# Patient Record
Sex: MALE | Race: WHITE | NOT HISPANIC OR LATINO | Employment: FULL TIME | URBAN - METROPOLITAN AREA
[De-identification: names, ages, dates, MRNs, and addresses within clinical notes are randomized per-mention and may not be internally consistent; named-entity substitution may affect disease eponyms.]

---

## 2021-04-13 ENCOUNTER — TRANSFERRED RECORDS (OUTPATIENT)
Dept: HEALTH INFORMATION MANAGEMENT | Facility: CLINIC | Age: 46
End: 2021-04-13
Payer: COMMERCIAL

## 2021-11-21 ENCOUNTER — HEALTH MAINTENANCE LETTER (OUTPATIENT)
Age: 46
End: 2021-11-21

## 2021-11-29 NOTE — PROGRESS NOTES
"New Bariatric Nutrition Consultation Note    Reason For Visit: Nutrition Assessment    Bethel Woods is a 46 year old presenting today for new bariatric nutrition consult.  Pt is interested in laparoscopic gastric bypass.  Patient is accompanied by self.    Support System Reviewed With Patient 11/16/2021   Who do you have in your support network that can be available to help you for the first 2 weeks after surgery? Mom and sister   Who can you count on for support throughout your weight loss surgery journey? Best friend       ANTHROPOMETRICS:  Estimated body mass index is 57.2 kg/m  as calculated from the following:    Height as of this encounter: 1.727 m (5' 8\").    Weight as of this encounter: 170.6 kg (376 lb 3.2 oz).    Required weight loss goal pre-op: 10 lbs from initial consult weight (goal weight 366.2 lbs or less before surgery)       11/16/2021   I have tried the following methods to lose weight Watching portions or calories, Exercise, Slimfast, OTC Medications, Prescription Medications       Weight Loss Questions Reviewed With Patient 11/16/2021   How long have you been overweight? Since late 20's to early 40's       NUTRITION HISTORY:  Recall Diet Questions Reviewed With Patient 11/16/2021   Describe what you typically consume for breakfast (typical or most recent): Dont eat breakfast   Describe what you typically consume for lunch (typical or most recent): Pizza, chicken,hamburgers   Describe what you typically consume as snacks (typical or most recent): Ice cream   How many ounces of water, or other low calorie drinks, do you drink daily (8 oz=1 glass)? 32 oz   How many ounces of caffeine (coffee, tea, pop) do you drink daily (8 oz=1 glass)? 8 oz   How many ounces of carbonated (pop, beer, sparkling water) drinks do you drinky daily (8 oz=1 glass)? 8 oz   How many ounces of juice, pop, sweet tea, sports drinks, protein drinks, other sweetened drinks, do you drink daily (8 oz=1 glass)? 8 oz   How many " "ounces of milk do you drink daily (8 oz=1 glass) 8 oz   Please indicate the type of milk: 2%, 1%, skim   How often do you drink alcohol? Monthly or less   If you do drink alcohol, how many drinks might you have in a day? (one drink = 5 oz. wine, 1 can/bottle of beer, 1 shot liquor) 1 or 2       Eating Habits 11/16/2021   Do you have any dietary restrictions? No   Do you currently binge eat (eat a large amount of food in a short time)? Yes   Are you an emotional eater? Yes   Do you get up to eat after falling asleep? Yes   What foods do you crave? Ice cream , fast food       ADDITIONAL INFORMATION:  Pt has been considering surgery for about a year. Note questionnaire answers r/t binge eating. Pt reports a history of emotional eating after \"fat-shaming\" on behalf of coworkers and family members. Endorses significant guilt as well as hiding of behavior. Occurred for several months, almost daily. However, currently behavior is controlled since starting in new work environment as well as establishing boundaries with family members. Binge would typically involve going to get fast food. Do not suspect pt to have true BED however given history of binge behavior will continue to prompt pt.     Education on lifestyle changes and nutrition-related side effects. Deferred discussion on vitamins/minerals and post-op diet advancement d/t time spent discussing binge behavior.     Dining Out History Reviewed With Patient 11/16/2021   How often do you dine out? Around once a week.   Where do you dine out? (select all that apply) sit-down restaurants, fast food chains   What types of food do you order when you dine out? margaret Rosa       Physical Activity Reviewed With Patient 11/16/2021   How often do you exercise? Less than 1 time per week   What is the duration of your exercise (in minutes)? 10 Minutes   What types of exercise do you do? walking, climbing stairs at work   What keeps you from being more active?  Shortness of " breath, Too tired, Worried people will look at me       NUTRITION DIAGNOSIS:  Obesity r/t long history of self-monitoring deficit and excessive energy intake aeb BMI >30 kg/m2.    INTERVENTION:  Intervention Provided/Education Provided on GI anatomy of bariatric surgeries, ways to help prepare for post-op diet guidelines pre-operatively, portion/calorie-control, mindful eating.  Provided pt with list of goals RD contact information.      Questions Reviewed With Patient 11/16/2021   How ready are you to make changes regarding your weight? Number 1 = Not ready at all to make changes up to 10 = very ready. 10   How confident are you that you can change? 1 = Not confident that you will be successful making changes up to 10 = very confident. 10       Patient Understanding: good  Expected Compliance: good    GOALS:  Limit carbonated beverages to 3-4 per day  Choose foods with less than 10g of sugar  Eat meals off a smaller plate and be mindful of starch portions      Follow-Up:   Recommend 2-3 follow up visits to assist with lifestyle changes or per insurance.  **Pt to verify insurance requirements    Time spent with patient: 45 minutes.      Maria Victoria López RD, LD  Clinical Dietitian

## 2021-11-30 ENCOUNTER — OFFICE VISIT (OUTPATIENT)
Dept: SURGERY | Facility: CLINIC | Age: 46
End: 2021-11-30
Payer: COMMERCIAL

## 2021-11-30 VITALS
SYSTOLIC BLOOD PRESSURE: 129 MMHG | HEIGHT: 68 IN | DIASTOLIC BLOOD PRESSURE: 90 MMHG | HEART RATE: 78 BPM | WEIGHT: 315 LBS | BODY MASS INDEX: 47.74 KG/M2 | OXYGEN SATURATION: 96 %

## 2021-11-30 VITALS — BODY MASS INDEX: 47.74 KG/M2 | HEIGHT: 68 IN | WEIGHT: 315 LBS

## 2021-11-30 DIAGNOSIS — F41.1 GENERALIZED ANXIETY DISORDER: ICD-10-CM

## 2021-11-30 DIAGNOSIS — G47.33 MILD OBSTRUCTIVE SLEEP APNEA: ICD-10-CM

## 2021-11-30 DIAGNOSIS — Z13.21 SCREENING FOR ENDOCRINE, NUTRITIONAL, METABOLIC AND IMMUNITY DISORDER: ICD-10-CM

## 2021-11-30 DIAGNOSIS — F32.A DEPRESSION, UNSPECIFIED DEPRESSION TYPE: ICD-10-CM

## 2021-11-30 DIAGNOSIS — Z13.29 SCREENING FOR ENDOCRINE, NUTRITIONAL, METABOLIC AND IMMUNITY DISORDER: ICD-10-CM

## 2021-11-30 DIAGNOSIS — Z13.228 SCREENING FOR ENDOCRINE, NUTRITIONAL, METABOLIC AND IMMUNITY DISORDER: ICD-10-CM

## 2021-11-30 DIAGNOSIS — E66.01 MORBID OBESITY (H): ICD-10-CM

## 2021-11-30 DIAGNOSIS — Z13.0 SCREENING FOR IRON DEFICIENCY ANEMIA: ICD-10-CM

## 2021-11-30 DIAGNOSIS — I10 ESSENTIAL HYPERTENSION: ICD-10-CM

## 2021-11-30 DIAGNOSIS — E66.01 MORBID OBESITY (H): Primary | ICD-10-CM

## 2021-11-30 DIAGNOSIS — Z13.0 SCREENING FOR ENDOCRINE, NUTRITIONAL, METABOLIC AND IMMUNITY DISORDER: ICD-10-CM

## 2021-11-30 PROBLEM — J45.20 MILD INTERMITTENT ASTHMA: Status: ACTIVE | Noted: 2021-11-30

## 2021-11-30 PROBLEM — I48.0 PAROXYSMAL ATRIAL FIBRILLATION (H): Status: ACTIVE | Noted: 2021-02-05
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
## 2021-11-30 PROCEDURE — 97802 MEDICAL NUTRITION INDIV IN: CPT | Performed by: DIETITIAN, REGISTERED

## 2021-11-30 PROCEDURE — 99205 OFFICE O/P NEW HI 60 MIN: CPT | Performed by: PHYSICIAN ASSISTANT

## 2021-11-30 RX ORDER — ALBUTEROL SULFATE 90 UG/1
2 AEROSOL, METERED RESPIRATORY (INHALATION) EVERY 4 HOURS PRN
COMMUNITY
Start: 2020-02-24

## 2021-11-30 RX ORDER — ASCORBIC ACID 500 MG
1000 TABLET ORAL DAILY
Status: ON HOLD | COMMUNITY
End: 2024-07-12

## 2021-11-30 RX ORDER — ALPRAZOLAM 0.5 MG
0.5 TABLET ORAL DAILY PRN
COMMUNITY
Start: 2020-12-29 | End: 2024-07-12

## 2021-11-30 RX ORDER — ACETAMINOPHEN 500 MG
1000 TABLET ORAL
COMMUNITY
Start: 2021-08-02 | End: 2022-05-25

## 2021-11-30 RX ORDER — LISINOPRIL AND HYDROCHLOROTHIAZIDE 20; 25 MG/1; MG/1
1 TABLET ORAL DAILY
COMMUNITY
Start: 2020-11-24 | End: 2022-05-27

## 2021-11-30 RX ORDER — PAROXETINE 40 MG/1
1 TABLET, FILM COATED ORAL DAILY
Status: ON HOLD | COMMUNITY
Start: 2020-05-04 | End: 2024-07-12

## 2021-11-30 RX ORDER — MULTIPLE VITAMINS W/ MINERALS TAB 9MG-400MCG
1 TAB ORAL DAILY
COMMUNITY
End: 2022-05-25

## 2021-11-30 RX ORDER — FLECAINIDE ACETATE 50 MG/1
100 TABLET ORAL 2 TIMES DAILY
COMMUNITY
Start: 2021-05-07 | End: 2022-11-28

## 2021-11-30 RX ORDER — METOPROLOL SUCCINATE 100 MG/1
50 TABLET, EXTENDED RELEASE ORAL 2 TIMES DAILY
Status: ON HOLD | COMMUNITY
Start: 2020-02-24 | End: 2022-05-27

## 2021-11-30 ASSESSMENT — MIFFLIN-ST. JEOR
SCORE: 2550.93
SCORE: 2550.93

## 2021-11-30 NOTE — PATIENT INSTRUCTIONS
Plan:   Please refer to your bariatric task list created today at your appointment.  Labs have been ordered in the system for you. You can have these drawn at any [facility] lab.  Please call [phone] set up an appointment.  Your results will be posted on [facility] as soon as they're complete.  After all are resulted, I will review and comment to you.  Psychological evaluation was ordered.  Call [phone] to be scheduled with Joshua Taylor, PhD,    FOLLOW-UP:  Call [phone] to make appointment to return for a pre-surgery in clinic visit in 4-8 weeks to see Elaina Wilkins PA-C. Make 1 month follow up nutrition visit virtually or in clinic.  _____________________________________________________________________  In general before being submitted for insurance approval, you will need the following:  -Clearance by the Psychologist  -Clearance by the dietitian  -Structured weight loss visits if mandated by your insurance carrier  -Surgeon consult  -Use CPAP for at least one month before surgery if you have sleep apnea. Make sure to bring your CPAP or BiPAP to the hospital at the time of surgery.  -You will need to be tobacco free for 3 months before surgery and remain a non-smoker thereafter. If you are currently smoking or have recently quit, your urine will be evaluated for tobacco metabolites pre-operatively.  -If you have diabetes, you will need to have an A1C less than or equal to 8.0 within 3 months of surgery.  -You will need an exercise plan which includes MOVE, ie., walking and MUSCLE, ie.,calisthenics, bands, weight, machines, etc...  _____________________________________________________________________  Remember that after your bariatric surgery, vitamin supplementation is a lifelong need.  You will take:    B-12 1000mcg or higher sublingual (under the tongue) daily or by injection 1-2X /month  Vitamin D3 5000U daily   Multivitamin containing 18mg of iron twice a day  Calcium citrate 1 or 2  daily  Thiamine 100 mg weekly   Vitron C once daily if you are a menstruating female  To keep your weight off and your vitamin levels up, follow-up is important.  Your labs will be monitored every 3 months for the first year and yearly thereafter.  To avoid ulcers in your stomach avoid tobacco forever, alcohol in excess, caffeine in excess and anti-inflammatories (NSAIDS)  (Aspirin, Ibuprofen, Naproxen and similar medications). Tylenol is fine.  If you are told by your physician take Aspirin to protect your heart or for another reason, make sure to take omeprazole or similar medication (protonix, nexium, prevacid) to protect your stomach.  Remember that alcohol affects you differently after bariatric surgery. If you have even ONE drink DO NOT DRIVE.

## 2021-11-30 NOTE — PATIENT INSTRUCTIONS
1. Reduce carbonated beverages to 3-4 per day    2. Be mindful of sugar intake  - choose products with less than 10g of sugar    3. Practice portion control  - Eat meals off a smaller plate  - Be mindful of portions of starch

## 2021-11-30 NOTE — PROGRESS NOTES
"New Bariatric Surgery Consultation Note    2021    RE: Bethel Woods  MR#: [id number]  : 1975    Referring provider: No flowsheet data found.    Chief Complaint/Reason for visit: evaluation for possible weight loss surgery    Dear Rae Rios MD (General),    I had the pleasure of seeing your patient, Bethel Woods, to evaluate his obesity and consider him for possible weight loss surgery. As you know, Bethel Woods is 46 year old.  He has a height of 5' 8\", a weight of 376 lbs 3.2 oz, and calculated Body mass index is 57.2 kg/m .     Assessment & Plan   Problem List Items Addressed This Visit     Essential hypertension    Relevant Medications    lisinopril-hydrochlorothiazide (ZESTORETIC) 20-25 MG tablet    Other Relevant Orders    Comprehensive metabolic panel    Depression    Relevant Medications    PARoxetine (PAXIL) 40 MG tablet    ALPRAZolam (XANAX) 0.5 MG tablet    Other Relevant Orders    MENTAL HEALTH REFERRAL  - Adult; Assessments and Testing; [phone]; Patient call to schedule    Generalized anxiety disorder    Relevant Medications    PARoxetine (PAXIL) 40 MG tablet    ALPRAZolam (XANAX) 0.5 MG tablet    Other Relevant Orders    MENTAL HEALTH REFERRAL  - Adult; Assessments and Testing; Bariatric Eval; [facility] [phone]; Patient call to schedule    Morbid obesity (H) - Primary    Relevant Orders    NUTRITION REFERRAL    CBC with platelets    Comprehensive metabolic panel    Hemoglobin A1c    Vitamin D Deficiency    MENTAL HEALTH REFERRAL  - Adult; Assessments and Testing; Bariatric Eval; [facility] [phone]; Patient call to schedule    Mild obstructive sleep apnea    Relevant Orders    Comprehensive metabolic panel      Other Visit Diagnoses     Screening for iron deficiency anemia        Relevant Orders    CBC with platelets    Screening " for endocrine, nutritional, metabolic and immunity disorder        Relevant Orders    Hemoglobin A1c    Vitamin D Deficiency           65 minutes spent on the date of the encounter doing chart review, history and exam, review test results, counseling, developing plan of care, documentation, and further activities as noted above.       HISTORY OF PRESENT ILLNESS:  Weight Loss History Reviewed with Patient 11/16/2021   How long have you been overweight? Since late 20's to early 40's   What is the most that you have ever weighed? 395   What is the most weight you have lost? 110- not eating   I have tried the following methods to lose weight Watching portions or calories, Exercise, Slimfast, OTC Medications, Prescription Medications   I have tried the following weight loss medications? (Check all that apply) Phentermine/Adipex-p/Suprenza, Fen-Phen   Have you ever had weight loss surgery? No       CO-MORBIDITIES OF OBESITY INCLUDE:     11/16/2021   I have the following health issues associated with obesity: High Blood Pressure, Asthma       PAST MEDICAL HISTORY:  Past Medical History:   Diagnosis Date     Hypertension        PAST SURGICAL HISTORY:  Past Surgical History:   Procedure Laterality Date     HC OR CATH ABLATION NON-CARDIAC ENDOVASCULAR       VASECTOMY         FAMILY HISTORY:   Family History   Problem Relation Age of Onset     Obesity Mother      Coronary Artery Disease Father 62        MI- Fatal     Cerebrovascular Disease Father      Obesity Maternal Grandmother      Coronary Artery Disease Maternal Grandfather      Hypertension Maternal Grandfather      Hyperlipidemia Maternal Grandfather      Obesity Brother        SOCIAL HISTORY:   Social History Questions Reviewed With Patient 11/16/2021   Which best describes your employment status (select all that apply) I work full-time, I work nights  10-6:30am    If you work, what is your occupation?    Which best describes your marital status:  Single, but mom lives with him.     Do you have children? Yes, 1 son age 24.     Who do you have in your support network that can be available to help you for the first 2 weeks after surgery? Mom and sister   Who can you count on for support throughout your weight loss surgery journey? Best friend   Can you afford 3 meals a day?  Yes   Can you afford 50-60 dollars a month for vitamins? Yes       HABITS:     11/16/2021   How often do you drink alcohol? Monthly or less   If you do drink alcohol, how many drinks might you have in a day? (one drink = 5 oz. wine, 1 can/bottle of beer, 1 shot liquor) 1 or 2   Have you ever used any of the following nicotine products? No   Have you or are you currently using street drugs or prescription strength medication for which you do not have a prescription for? No   Do you have a history of chemical dependency (alcohol or drug abuse)? No       PSYCHOLOGICAL HISTORY:   Psychological History Reviewed With Patient 11/16/2021   Have you ever attempted suicide? Never.   Have you had thoughts of suicide in the past year? No   Have you ever been hospitalized for mental illness or a suicide attempt? Never.   Do you have a history of chronic pain? No   Have you ever been diagnosed with fibromyalgia? No   Are you currently being treated for any of the following? (select all that apply) Depression, Anxiety   Are you currently seeing a therapist or counselor?  No   Are you currently seeing a psychiatrist? No       ROS:     11/16/2021   Skin:  None of the above   HEENT: Headaches- minimal since stopping caffeine 2 years ago,   Musculoskeletal: Joint Pain- knees   Cardiovascular: Shortness of breath with activity   Pulmonary: Shortness of breath with activity, Snoring   Gastrointestinal: Heartburn- minimal since stopping caffeine   Genitourinary: None of the above   Hematological: None of the above   Neurological: None of the above       EATING BEHAVIORS:     11/16/2021   Have you or anyone else  thought that you had an eating disorder? Yes   If you answered yes to the previous eating disorder question, select the types that apply from this list: Binge Eating- some days pt is out and about and he didn't eat all day so then he eats, eats, eats.  Or he stress eats.    Do you currently binge eat (eat a large amount of food in a short time)? Yes   Are you an emotional eater? Yes   Do you get up to eat after falling asleep? Yes     Does your binge eating cause you stress? yes  Does binge occur at least 1 time per week for the past 3 months?  Occurs once weekly for the past 15-16 years.  Has a lot to do because he was teased about his weight.  Occurs due to how he is feeling.  Was happening more frequently than once a week with old job.  (New job since August).  He got another job because he was fat shamed in the past.  The only time he really does this now is when his mom makes those comments.  Less of a binge and more just emotional eating during these times.   EXERCISE:     11/16/2021   How often do you exercise? Less than 1 time per week   What is the duration of your exercise (in minutes)? 10 Minutes   What types of exercise do you do? walking, climbing stairs at work   What keeps you from being more active?  Shortness of breath, Too tired, Worried people will look at me     Using Albuterol daily when using stairs. Not on another medication for asthma      MEDICATIONS:  Current Outpatient Medications   Medication     acetaminophen (TYLENOL) 500 MG tablet     albuterol (VENTOLIN HFA) 108 (90 Base) MCG/ACT inhaler     ALPRAZolam (XANAX) 0.5 MG tablet     apixaban ANTICOAGULANT (ELIQUIS ANTICOAGULANT) 5 MG tablet     Aspirin-Acetaminophen-Caffeine (EXCEDRIN PO)     flecainide (TAMBOCOR) 50 MG tablet     lisinopril-hydrochlorothiazide (ZESTORETIC) 20-25 MG tablet     metoprolol succinate ER (TOPROL-XL) 50 MG 24 hr tablet     Multiple Vitamin (MULTI VITAMIN MENS PO)     multivitamin w/minerals (MULTIVITAMIN,  "THERAPEUTIC WITH MINERALS) tablet     PARoxetine (PAXIL) 40 MG tablet     vitamin C (ASCORBIC ACID) 500 MG tablet     No current facility-administered medications for this visit.        ALLERGIES:  No Known Allergies    LABS AND RECORDS REVIEWED:  TSH   Date Value Ref Range Status   06/03/2009 1.89 0.4 - 5.0 mU/L Final         PHYSICAL EXAM:  BP (!) 129/90   Pulse 78   Ht 5' 8\" (1.727 m)   Wt (!) 376 lb 3.2 oz (170.6 kg)   SpO2 96%   BMI 57.20 kg/m    GENERAL:  Good development and normal affect in no acute distress. Patient is alert and orientated x 3 and answers all questions appropriately.  HEENT: Head is normocephalic, nontraumatic. Pupils equal and round without conjunctival injection. Neck is supple without lymphadenopathy, thyroidmegaly, or mass. Trachea midline. Dentition shows missing molar on upper left and lower left not on top of each other. Dentition appropriate for chewing following surgery.   CARDIOVASCULAR:  Regular rate and rhythm without murmurs, rubs, or gallops.  RESPIRATORY: Lungs are clear to auscultation bilaterally with good breath sounds.  GASTROINTESTINAL: Abdomen is obese, nondistended, soft, nontender, without organomegaly or masses. No bruits.  LOWER EXTREMITIES: No LE edema bilaterally, no cyanosis, ulceration, or chronic venous stasis noted.  MUSCULOSKELETAL:  Moves all 4 extremities equal and strong. Has a normal gait.   NEUROLOGIC: Cranial nerves II-XII grossly intact.  SKIN: No intertriginous irritation or rash.     In summary, Bethel Woods has Class III obesity with a body mass index of Body mass index is 57.2 kg/m . kg/m2 and the comorbidities stated above. He completed an informational seminar and is a possible candidate for the laparoscopic gastric bypass.  He will have to complete the following pre-requisites:    Lose 10 lbs prior to surgery.  Goal Weight: 366 lbs    Have preoperative laboratory tests drawn.     Psychological Evaluation with MMPI and clearance for weight " loss surgery.    Achieve clearance from dietitian to see surgeon.    Check if structured dietitian weight loss visits are required by your insurance.    Get records from [facility] ([facility] signed and sent to [facility] today)    See primary care provider about better asthma control     Once Bethel has completed the requirements in the above tasklist and there are no further recommendations, he will see the surgeon for consultation for bariatric surgery. Pt to follow up in 1-2 months for a face to face visit with me. We will discuss the gastric bypass surgery including risks and benefits and review his tasklist.  Bethel verbalizes understanding of the process to surgery and the post operative schedule.  All questions were answered.      Sincerely,     Elaina Wilkins PA-C

## 2021-11-30 NOTE — LETTER
Bethel Woods  November 30, 2021        Bariatric Task List      Required Weight loss:    Lose 10 lbs prior to surgery.  Goal Weight: 366 lbs  Tasks:  Have preoperative laboratory tests drawn.     Psychological Evaluation with MMPI and clearance for weight loss surgery.    Achieve clearance from dietitian to see surgeon.    Check if structured dietitian weight loss visits are required by your insurance.    Get records from [facility]    See primary care provider about better asthma control

## 2021-12-27 ENCOUNTER — LAB (OUTPATIENT)
Dept: LAB | Facility: CLINIC | Age: 46
End: 2021-12-27
Payer: COMMERCIAL

## 2021-12-27 DIAGNOSIS — Z13.21 SCREENING FOR ENDOCRINE, NUTRITIONAL, METABOLIC AND IMMUNITY DISORDER: ICD-10-CM

## 2021-12-27 DIAGNOSIS — I10 ESSENTIAL HYPERTENSION: ICD-10-CM

## 2021-12-27 DIAGNOSIS — Z13.0 SCREENING FOR IRON DEFICIENCY ANEMIA: ICD-10-CM

## 2021-12-27 DIAGNOSIS — Z13.0 SCREENING FOR ENDOCRINE, NUTRITIONAL, METABOLIC AND IMMUNITY DISORDER: ICD-10-CM

## 2021-12-27 DIAGNOSIS — Z13.228 SCREENING FOR ENDOCRINE, NUTRITIONAL, METABOLIC AND IMMUNITY DISORDER: ICD-10-CM

## 2021-12-27 DIAGNOSIS — G47.33 MILD OBSTRUCTIVE SLEEP APNEA: ICD-10-CM

## 2021-12-27 DIAGNOSIS — E66.01 MORBID OBESITY (H): ICD-10-CM

## 2021-12-27 DIAGNOSIS — Z13.29 SCREENING FOR ENDOCRINE, NUTRITIONAL, METABOLIC AND IMMUNITY DISORDER: ICD-10-CM

## 2021-12-27 LAB
ALBUMIN SERPL-MCNC: 3.6 G/DL (ref 3.4–5)
ALP SERPL-CCNC: 99 U/L (ref 40–150)
ALT SERPL W P-5'-P-CCNC: 31 U/L (ref 0–70)
ANION GAP SERPL CALCULATED.3IONS-SCNC: 5 MMOL/L (ref 3–14)
AST SERPL W P-5'-P-CCNC: 19 U/L (ref 0–45)
BILIRUB SERPL-MCNC: 0.3 MG/DL (ref 0.2–1.3)
BUN SERPL-MCNC: 14 MG/DL (ref 7–30)
CALCIUM SERPL-MCNC: 9.5 MG/DL (ref 8.5–10.1)
CHLORIDE BLD-SCNC: 102 MMOL/L (ref 94–109)
CO2 SERPL-SCNC: 29 MMOL/L (ref 20–32)
CREAT SERPL-MCNC: 0.77 MG/DL (ref 0.66–1.25)
DEPRECATED CALCIDIOL+CALCIFEROL SERPL-MC: 28 UG/L (ref 20–75)
ERYTHROCYTE [DISTWIDTH] IN BLOOD BY AUTOMATED COUNT: 14.8 % (ref 10–15)
GFR SERPL CREATININE-BSD FRML MDRD: >90 ML/MIN/1.73M2
GLUCOSE BLD-MCNC: 91 MG/DL (ref 70–99)
HBA1C MFR BLD: 5.5 % (ref 0–5.6)
HCT VFR BLD AUTO: 44.1 % (ref 40–53)
HGB BLD-MCNC: 14.6 G/DL (ref 13.3–17.7)
MCH RBC QN AUTO: 27 PG (ref 26.5–33)
MCHC RBC AUTO-ENTMCNC: 33.1 G/DL (ref 31.5–36.5)
MCV RBC AUTO: 82 FL (ref 78–100)
PLATELET # BLD AUTO: 415 10E3/UL (ref 150–450)
POTASSIUM BLD-SCNC: 4.4 MMOL/L (ref 3.4–5.3)
PROT SERPL-MCNC: 8.5 G/DL (ref 6.8–8.8)
RBC # BLD AUTO: 5.4 10E6/UL (ref 4.4–5.9)
SODIUM SERPL-SCNC: 136 MMOL/L (ref 133–144)
WBC # BLD AUTO: 13.1 10E3/UL (ref 4–11)

## 2021-12-27 PROCEDURE — 85027 COMPLETE CBC AUTOMATED: CPT

## 2021-12-27 PROCEDURE — 80053 COMPREHEN METABOLIC PANEL: CPT

## 2021-12-27 PROCEDURE — 82306 VITAMIN D 25 HYDROXY: CPT

## 2021-12-27 PROCEDURE — 36415 COLL VENOUS BLD VENIPUNCTURE: CPT

## 2021-12-27 PROCEDURE — 83036 HEMOGLOBIN GLYCOSYLATED A1C: CPT

## 2021-12-27 NOTE — PROGRESS NOTES
"PRE SURGICAL WEIGHT LOSS NUTRITION APPOINTMENT    Bethel Woods  1975  male  [id number]  46 year old    ASSESSMENT    Desired Surgical Procedure: gastric bypass    REASON FOR VISIT:  Bethel Woods is a 46 year old year old male presents today for a pre-surgical weight loss follow-up appointment. Patient accompanied by self.    DIAGNOSIS:  Weight Status Obesity Grade III BMI >40    ANTHROPOMETRICS:  Height: 68\"     Initial Weight: 366.2 lbs     Current weight: 379 lbs 0 oz    BMI: Body mass index is 57.63 kg/m .    VITAMINS AND MINERALS:  1 Multivitamin w/Minerals (One-A-Day Men)      NUTRITION HISTORY:  Breakfast: [wakes at 5:30pm, eats at 6pm] steak or chicken + baked potato + corn   pizza (3 slices)  Lunch: skips  Supper: [7am] bowl of cereal  Snacks: none   Fluids Consumed: water (24-36oz), carbonated water (40oz), milk (2 glasses/week - skim, 1% or 2%)  Eating slower: No  Chewing foods thoroughly: No  Take 20-30 minutes to consume each meal: No  Fluids and meals separate by at least 30 minutes: No  Carbonation: yes  Caffeine: none  Additional Information: Completed surgical evaluation education today on post-op diet advancement vitamin/mineral needs. Reviewed expectations for behavior change in anticipation of clearance. Pt states weight gain d/t overeating throughout holidays.     PHYSICAL ACTIVITY:  none    DIAGNOSIS:  Previous Nutrition Diagnosis: Obesity related to long history of self- monitoring deficit and excessive energy intake evidenced by BMI of 57.2 kg/m2  No change, modified below    Previous goals:   Limit carbonated beverages to 3-4 per day - met  Choose foods with less than 10g of sugar - not met  Eat meals off a smaller plate and be mindful of starch portions - improving    Current Nutrition Diagnosis: Obesity related to long history of self-monitoring deficit and excessive energy intake as evidenced by BMI of 57.63 kg/m2.    INTERVENTION:  Nutrition Prescription: Recommended " energy/nutrient modification.    GOALS:  Begin taking multivitamin, calcium and vitamin D per guidelines  Avoid skipping meals  Choose foods with less than 10g of sugar      Intervention:  - Discussed progress towards previous goals.  - Reinforced importance of making behavior changes in preparation for bariatric surgery.   - Assessed learning needs and learning preferences       NUTRITION MONITORING AND EVALUATION:  Anticipated compliance: fair-good  Patient demonstrated fair-good understanding.     Follow up: Continue to monitor patient closely regarding weight loss and diet.  # of visits needed: 1-2+  Cleared by RD: No     TIME SPENT WITH PATIENT: 25 minutes      Maria Victoria López RD, LD  Clinical Dietitian

## 2021-12-28 ENCOUNTER — OFFICE VISIT (OUTPATIENT)
Dept: SURGERY | Facility: CLINIC | Age: 46
End: 2021-12-28
Payer: COMMERCIAL

## 2021-12-28 VITALS — WEIGHT: 315 LBS | BODY MASS INDEX: 47.74 KG/M2 | HEIGHT: 68 IN

## 2021-12-28 DIAGNOSIS — E66.01 MORBID OBESITY (H): ICD-10-CM

## 2021-12-28 PROCEDURE — 97803 MED NUTRITION INDIV SUBSEQ: CPT | Performed by: DIETITIAN, REGISTERED

## 2021-12-28 ASSESSMENT — MIFFLIN-ST. JEOR: SCORE: 2563.63

## 2021-12-28 NOTE — PATIENT INSTRUCTIONS
1. Begin taking:  Multivitamin  Calcium  Vitamin D      2. Avoid skipping meals  - Eat your first meal within 1 hour of waking up  - Eat meals every 4-6 hours      3. Choose foods with less than 10g of sugar per serving

## 2022-01-16 NOTE — PROGRESS NOTES
Bariatric Pre-Surgery Visit    CC: Obesity    HPI: I had the pleasure of seeing Bethel in the office today to go over bariatric education.  I saw the patient last month to evaluate obesity and consider him for possible weight loss surgery. Today,  we will discuss the gastric bypass surgery including risks and benefits and review his tasklist.     Wt Readings from Last 4 Encounters:   01/18/22 (!) 378 lb (171.5 kg)   12/28/21 (!) 379 lb (171.9 kg)   11/30/21 (!) 376 lb 3.2 oz (170.6 kg)   11/30/21 (!) 376 lb 3.2 oz (170.6 kg)        Labs Reviewed:  Hemoglobin A1C   Date Value Ref Range Status   12/27/2021 5.5 0.0 - 5.6 % Final     Comment:     Normal <5.7%   Prediabetes 5.7-6.4%    Diabetes 6.5% or higher     Note: Adopted from ADA consensus guidelines.     Vitamin D, Total (25-Hydroxy)   Date Value Ref Range Status   12/27/2021 28 20 - 75 ug/L Final     TSH   Date Value Ref Range Status   06/03/2009 1.89 0.4 - 5.0 mU/L Final     Sodium   Date Value Ref Range Status   12/27/2021 136 133 - 144 mmol/L Final     Potassium   Date Value Ref Range Status   12/27/2021 4.4 3.4 - 5.3 mmol/L Final     Chloride   Date Value Ref Range Status   12/27/2021 102 94 - 109 mmol/L Final     Carbon Dioxide (CO2)   Date Value Ref Range Status   12/27/2021 29 20 - 32 mmol/L Final     Anion Gap   Date Value Ref Range Status   12/27/2021 5 3 - 14 mmol/L Final     Glucose   Date Value Ref Range Status   12/27/2021 91 70 - 99 mg/dL Final     Urea Nitrogen   Date Value Ref Range Status   12/27/2021 14 7 - 30 mg/dL Final     Creatinine   Date Value Ref Range Status   12/27/2021 0.77 0.66 - 1.25 mg/dL Final     GFR Estimate   Date Value Ref Range Status   12/27/2021 >90 >60 mL/min/1.73m2 Final     Comment:     Effective December 21, 2021 eGFRcr in adults is calculated using the 2021 CKD-EPI creatinine equation which includes age and gender (Juanjose et al., NEJM, DOI: 10.1056/URPFab7020485)     Calcium   Date Value Ref Range Status   12/27/2021 9.5  "8.5 - 10.1 mg/dL Final     Bilirubin Total   Date Value Ref Range Status   12/27/2021 0.3 0.2 - 1.3 mg/dL Final     Alkaline Phosphatase   Date Value Ref Range Status   12/27/2021 99 40 - 150 U/L Final     ALT   Date Value Ref Range Status   12/27/2021 31 0 - 70 U/L Final     AST   Date Value Ref Range Status   12/27/2021 19 0 - 45 U/L Final     WBC Count   Date Value Ref Range Status   12/27/2021 13.1 (H) 4.0 - 11.0 10e3/uL Final     Hemoglobin   Date Value Ref Range Status   12/27/2021 14.6 13.3 - 17.7 g/dL Final     Hematocrit   Date Value Ref Range Status   12/27/2021 44.1 40.0 - 53.0 % Final     MCV   Date Value Ref Range Status   12/27/2021 82 78 - 100 fL Final     Platelet Count   Date Value Ref Range Status   12/27/2021 415 150 - 450 10e3/uL Final       PE:  Ht 5' 8\" (1.727 m)   Wt (!) 378 lb (171.5 kg)   BMI 57.47 kg/m    GENERAL: Healthy, alert and no distress  EYES: Eyes grossly normal to inspection.  No discharge or erythema, or obvious scleral/conjunctival abnormalities.  RESP: No audible wheeze, cough, or visible cyanosis.  No visible retractions or increased work of breathing.    SKIN: Visible skin clear. No significant rash, abnormal pigmentation or lesions.  NEURO: Cranial nerves grossly intact.  Mentation and speech appropriate for age.  PSYCH: Mentation appears normal, affect normal/bright, judgement and insight intact, normal speech and appearance well-groomed.    Assessment:  Morbid Obesity  Bariatric Education    Plan:  Reviewed tasklist    Lose 10 lbs prior to surgery.  Goal Weight: 366 lbs -pending    Have preoperative laboratory tests drawn.- completed     Psychological Evaluation with MMPI and clearance for weight loss surgery.- pending    Achieve clearance from dietitian to see surgeon.-pending    Check if structured dietitian weight loss visits are required by your insurance..- completed none needed    Get records from [facility] (BRYAN signed and sent to [facility] today)- completed, mild DEEPALI, " no CPAP optional     See primary care provider about better asthma control- on advair , completed    Today in the office we discussed gastric bypass surgery.  Preoperative, perioperative, and postoperative processes, management, and follow up were addressed.  Risks and benefits were outlined including the risk of death, PE, DVT, ulcer, N/V, stricture, hernia, wound infection, weight regain, and vitamin deficiencies. I emphasized exercise and activity along with appropriate food choice as the main foundation for weight loss with surgery providing surgical reinforcement of this.  A goal sheet and support group handout were given to the patient. Patient contract was signed.     Once the patient has completed their task list and there are no further recommendations, they will see the surgeon for consultation for bariatric surgery.  All questions were answered. Patient verbalizes understanding of the process to surgery and expectations for the postoperative period including the need for lifelong lifestyle changes, vitamin supplementation, and laboratory monitoring.       Sincerely,     Elaina Wilkins PA-C    FOLLOW-UP:  Return to clinic in monthly until cleared by dietitian.      30 minutes spent on the date of the encounter reviewing chart, labs, patient education, charting, and plan of care.

## 2022-01-17 NOTE — PROGRESS NOTES
"Video-Visit Details    Type of service:  Video Visit    Video Start Time (time video started): 10:38    Video End Time (time video stopped): 11:06    Originating Location (pt. Location): Home    Distant Location (provider location):  [facility] [place]     Mode of Communication:  Video Conference via [facility]        PRE SURGICAL WEIGHT LOSS NUTRITION APPOINTMENT    Bethel Woods  1975  male  [id number]  46 year old    ASSESSMENT    Desired Surgical Procedure: gastric bypass    REASON FOR VISIT:  Bethel Woods is a 46 year old year old male presents today for a pre-surgical weight loss follow-up appointment. Patient accompanied by self.    DIAGNOSIS:  Weight Status Obesity Grade III BMI >40    ANTHROPOMETRICS:  Height: 68\"   Initial Weight: 376.2 lb     Current weight:378 lb per pt    BMI: 57.47  kg/(m^2).    VITAMINS AND MINERALS:  1 Multivitamin with Minerals ([employer] multivitamin/ can't read the bottle) -7 am after work  18 mg iron-noon  600 mg Calcium Citrate with Vitamin D BID at 4 pm and 9:30 pm  5000 International units Vitamin D-noon      NUTRITION HISTORY:  Breakfast: 1 cup cold cereal with milk or 2 eggs  Lunch: meat sandwich or soup (variety)  Supper: steak or chicken, corn or green beans, sometimes 1/2 potato  Snacks: sugar free gelatin or 3 protein drink with 20 grams protein/day  Fluids Consumed: oz  Water or enhanced water, 3 protein drinks, very rare ETOH (6 pack of beer per year)  Eating slower: Yes  Chewing foods thoroughly: Yes  Take 20-30 minutes to consume each meal: Yes  Fluids and meals separate by at least 30 minutes: No  Carbonation: no  Caffeine: no  Additional Information: He feel like eating smaller portions has helped the  most with weight loss. Patient is \"excited\" to be moving forward with weight loss surgery. Walks 8 hour/day at work, but plans to look into buying a home exercise machine. After talking about intentional exercise he is willing " to try mall walking.    PHYSICAL ACTIVITY:  Type: no intentional exercise      DIAGNOSIS:  Previous Nutrition Diagnosis: Obesity related to long history of self- monitoring deficit and excessive energy intake evidenced by BMI of 57.63 kg/m2  No change, modified below    Previous goals:   Begin taking multivitamin, calcium and vitamin D per guidelines-met  Avoid skipping meals-met  Choose foods with less than 10g of sugar-met    Current Nutrition Diagnosis: Obesity related to long history of self-monitoring deficit and excessive energy intake as evidenced by BMI of 57.47 kg/m2.    INTERVENTION:  Nutrition Prescription: Recommended energy/nutrient modification.    GOALS:  Walk (at mall or outside) 2-3 times per week for 30 minutes  Set alerts to remember to stop drinking 30 minutes before and after meals  Focus on eating protein at each meal/ reduce protein drinks to 1-2 per day      Intervention:  -Reviewed foods rich in protein and provided source of protein food list  - Discussed progress towards previous goals.  - Reinforced importance of making behavior changes in preparation for bariatric surgery.   - Assessed learning needs and learning preferences       NUTRITION MONITORING AND EVALUATION:  Anticipated compliance: good  Patient demonstrated good understanding.       Follow up: Continue to monitor patient closely regarding weight loss and diet.  # of visits needed: 1-2  Cleared by RD: No     TIME SPENT WITH PATIENT: 28 minutes  Luisito Hair RD, LD  [facility]

## 2022-01-18 ENCOUNTER — VIRTUAL VISIT (OUTPATIENT)
Dept: SURGERY | Facility: CLINIC | Age: 47
End: 2022-01-18
Payer: COMMERCIAL

## 2022-01-18 VITALS — WEIGHT: 315 LBS | HEIGHT: 68 IN | BODY MASS INDEX: 47.74 KG/M2

## 2022-01-18 DIAGNOSIS — E66.01 MORBID OBESITY (H): Primary | ICD-10-CM

## 2022-01-18 DIAGNOSIS — E66.01 MORBID OBESITY (H): ICD-10-CM

## 2022-01-18 DIAGNOSIS — Z71.89 ENCOUNTER FOR PRE-BARIATRIC SURGERY COUNSELING AND EDUCATION: ICD-10-CM

## 2022-01-18 PROCEDURE — 97803 MED NUTRITION INDIV SUBSEQ: CPT | Mod: GT | Performed by: DIETITIAN, REGISTERED

## 2022-01-18 PROCEDURE — 99214 OFFICE O/P EST MOD 30 MIN: CPT | Mod: GT | Performed by: PHYSICIAN ASSISTANT

## 2022-01-18 RX ORDER — FERROUS SULFATE 325(65) MG
325 TABLET ORAL
COMMUNITY
End: 2022-08-29

## 2022-01-18 ASSESSMENT — MIFFLIN-ST. JEOR: SCORE: 2559.1

## 2022-01-18 NOTE — PROGRESS NOTES
Bethel is a 46 year old who is being evaluated via a billable video visit.      If the video visit is dropped, the invitation should be resent by: Text to cell phone: 1  Will anyone else be joining your video visit? No      Video-Visit Details    Type of service:  Video Visit    Video Start Time: 10:05 AM    Video End Time:10:28 AM     Originating Location (pt. Location): Home    Distant Location (provider location):  [facility] CL[facility] [place]     Platform used for Video Visit: [employer]

## 2022-01-18 NOTE — PATIENT INSTRUCTIONS
Gopi Hugo-  It was great to visit with you and learn about your progress. Below are the goals we discussed.    GOALS:  Walk (at mall or outside) 2-3 times per week for 30 minutes  Set alerts to remember to stop drinking 30 minutes before and after meals  Focus on eating protein at each meal/ reduce protein drinks to 1-2 per day    Nutrition Educational Materials:  Protein Sources for Weight Loss  [email or web address]       Please call [phone] to schedule your next  visit with a Dietitian in 1 month.  Thanks!  Luisito Hair RD, LD  [facility]

## 2022-02-21 NOTE — PROGRESS NOTES
"Video-Visit Details    Type of service:  Video Visit    Video Start Time (time video started): 9:59    Video End Time (time video stopped): 10:16    Originating Location (pt. Location): Home    Distant Location (provider location):  [facility] [place] /[facility] location    Mode of Communication:  Video Conference via [employer]      PRE SURGICAL WEIGHT LOSS NUTRITION APPOINTMENT    Bethel Woods  1975  male  [id number]  46 year old    ASSESSMENT    Desired Surgical Procedure: gastric bypass    REASON FOR VISIT:  Bethel Woods is a 46 year old year old male presents today for a pre-surgical weight loss follow-up appointment. Patient accompanied by self.    DIAGNOSIS:  Weight Status Obesity Grade III BMI >40    ANTHROPOMETRICS:  Height:  68\"  Initial Weight: 376.2 lb   Weight last visit: 378 lb    Current weight: 363 lb per pt   BMI:  55.19 Kg/(m^2).    Wt Readings from Last 5 Encounters:   01/18/22 (!) 171.5 kg (378 lb)   12/28/21 (!) 171.9 kg (379 lb)   11/30/21 (!) 170.6 kg (376 lb 3.2 oz)   11/30/21 (!) 170.6 kg (376 lb 3.2 oz)       VITAMINS AND MINERALS:  1 Multivitamin with Minerals ([employer] multivitamin/ can't read the bottle) -7 am after work  18 mg iron-noon  600 mg Calcium Citrate with Vitamin D BID at 4 pm and 9:30 pm  5000 International units Vitamin D-noon  1000 mg vitamin C      NUTRITION HISTORY:  Breakfast: 2 eggs  Lunch: 1/2 meat sandwich  Supper: chicken thigh or breast or cod, corn, green beans  Snacks: 2 protein drinks  Fluids Consumed: Water, Crystal Light and Protein Drink,  Eating slower: Yes  Chewing foods thoroughly: Yes-most of the time  Take 20-30 minutes to consume each meal: Yes  Fluids and meals separate by at least 30 minutes: Yes  Carbonation: no  Caffeine: no  Additional Information: Patient attributes weight loss to watching portions very carefully. He is feeling \"excited\" about moving forward with weight loss surgery. Has joined a Facebook support " group. Plans to order a bariatric multivitamin for after surgery. Discussed possible option for products.    PHYSICAL ACTIVITY:  Type: walking  Frequency: 2-3 (days per week)  Duration: 30 (minutes)     DIAGNOSIS:  Previous Nutrition Diagnosis: Obesity related to long history of self- monitoring deficit and excessive energy intake evidenced by BMI of 57.47 kg/m2  No change, modified below    Previous goals:   Walk (at mall or outside) 2-3 times per week for 30 minutes-met  Set alerts to remember to stop drinking 30 minutes before and after meals-met  Focus on eating protein at each meal/ reduce protein drinks to 1-2 per day-met      Current Nutrition Diagnosis: Obesity related to long history of self-monitoring deficit and excessive energy intake as evidenced by BMI of 55.19 kg/m2.    INTERVENTION:  Nutrition Prescription: Recommended energy/nutrient modification.    GOALS:  Continue to practice all per-operative behavior changes      Intervention:  - Discussed progress towards previous goals.  - Reinforced importance of making behavior changes in preparation for bariatric surgery.   - Assessed learning needs and learning preferences       NUTRITION MONITORING AND EVALUATION:  Anticipated compliance: fair-good  Patient demonstrated good understanding.   Patient has met pre bariatric surgery diet requirements    Follow up: Continue to monitor patient closely regarding weight loss and diet.  # of visits needed: 0  Cleared by RD: Yes     TIME SPENT WITH PATIENT: 16 minutes  Luisito Hair RD, LD  [facility], [place]

## 2022-02-22 ENCOUNTER — VIRTUAL VISIT (OUTPATIENT)
Dept: SURGERY | Facility: CLINIC | Age: 47
End: 2022-02-22
Payer: COMMERCIAL

## 2022-02-22 VITALS — WEIGHT: 315 LBS | BODY MASS INDEX: 55.19 KG/M2

## 2022-02-22 DIAGNOSIS — E66.01 MORBID OBESITY (H): ICD-10-CM

## 2022-02-22 PROCEDURE — 97803 MED NUTRITION INDIV SUBSEQ: CPT | Mod: GT | Performed by: DIETITIAN, REGISTERED

## 2022-02-22 NOTE — PATIENT INSTRUCTIONS
Gopi Hugo-   It was great to visit with you and learn about your progress. Below are the goals we discussed.    GOALS:  Continue to practice all per-operative behavior changes    Nutrition Educational Materials:  -Recommend multivitamin/ mineral: Bariatric Advantage Ultra solo with Iron OR Opurity Bypass and Sleeve Optimized chewable (covers multivitamin, vitamin d and thiamine)      You should get a call in the next 1-3 days from a surgery scheduler to set up your consult with surgeon.     Thanks!  Luisito Hair RD, LD  [facility]

## 2022-03-08 ENCOUNTER — OFFICE VISIT (OUTPATIENT)
Dept: SURGERY | Facility: CLINIC | Age: 47
End: 2022-03-08
Payer: COMMERCIAL

## 2022-03-08 VITALS
WEIGHT: 315 LBS | DIASTOLIC BLOOD PRESSURE: 88 MMHG | BODY MASS INDEX: 47.74 KG/M2 | HEART RATE: 65 BPM | HEIGHT: 68 IN | SYSTOLIC BLOOD PRESSURE: 122 MMHG | OXYGEN SATURATION: 98 %

## 2022-03-08 DIAGNOSIS — E66.01 MORBID OBESITY (H): Primary | ICD-10-CM

## 2022-03-08 PROCEDURE — 99215 OFFICE O/P EST HI 40 MIN: CPT | Performed by: SURGERY

## 2022-03-08 NOTE — PROGRESS NOTES
"Dear Rae Cowan,    I was asked to see the patient regarding obesity by the referring provider above.    I had the pleasure of meeting with your patient Bethel Woods in our weight loss surgery office.  This patient is a 46 year old male who has been undergoing our thorough preoperative screening process in anticipation of potential bariatric surgery.    At evaluation we recorded Bethel Woods's Height: 172.7 cm (5' 8\"), 170.6 kg and 57.2 BMI.  The patient has been unsuccessful with other methods of permanent weight loss and suffers from multiple weight related medical conditions.  Due to lack of success in achieving weight loss through other methods, he is interested in undergoing bariatric surgery.    PREVIOUS WEIGHT LOSS ATTEMPTS:     11/16/2021   I have tried the following methods to lose weight Watching portions or calories, Exercise, Slimfast, OTC Medications, Prescription Medications       CO-MORBIDITIES OF OBESITY INCLUDE:     11/16/2021   I have the following health issues associated with obesity: High Blood Pressure, Asthma       VITALS:  /88   Pulse 65   Ht 1.727 m (5' 8\")   Wt (!) 170.6 kg (376 lb 3.2 oz)   SpO2 98%   BMI 57.20 kg/m      PE:  GENERAL: Alert and oriented x3. NAD  HEENT exam: Sclerae not icteric. Hearing good. Head normocephalic and atraumatic.   CARDIOVASCULAR: No JVD  RESPIRATORY: Breathing unlabored  GASTROINTESTINAL: Obese  LOWER EXTREMITIES: no deformities  MUSCULOSKELETAL: Normal gait, Moves all 4 extremities equal and strong  NEUROLOGIC: no gross defect  SKIN: warm and dry to touch     In summary, he has undergone an appropriate medical evaluation, dietitian evaluation, as well as psychologic screening. The patient appears to be an appropriate candidate for bariatric surgery.    In the office today, I discussed the laparoscopic gastric bypass surgery.  Risks, benefits and anticipated outcomes were outlined including the risk of death, staple line leak " (1-2%), PE, DVT, ulcer, worsening GERD, N/V, stricture, hernia, wound infection, weight regain, and vitamin deficiencies. This patient has a good chance of sustaining permanent weight loss due to this procedure.  This should also allow improvement if not resolution of his/her weight related medical conditions.    At present we are going to present your patient's file for prior authorization to insurance.  Pending prior authorization, I anticipate a surgical date in the near future.  We will keep you updated on any progress.  If you have questions regarding care please feel free to contact me.  Total time spent in the clinic was 60 minutes with greater than 50% in face-to-face consultation.        Sincerely,    Randy Mesa MD    Please route or send letter to:  Primary Care Provider (PCP) and Referring Provider

## 2022-03-28 NOTE — PROGRESS NOTES
Virtual bariatric pre op class completed.  Class power point and patient checklist information gone over with patient.    All questions were answered.  Quiz was completed.    Patient to take picture of weight and My Chart to clinic or call weight to clinic.  Patient was advised to call if further questions.  Ivone Louis, MS, RD, RN

## 2022-04-01 ENCOUNTER — VIRTUAL VISIT (OUTPATIENT)
Dept: SURGERY | Facility: CLINIC | Age: 47
End: 2022-04-01
Payer: COMMERCIAL

## 2022-04-01 DIAGNOSIS — E66.01 MORBID OBESITY (H): Primary | ICD-10-CM

## 2022-04-01 PROCEDURE — 99207 PR NO CHARGE NURSE ONLY: CPT

## 2022-04-04 NOTE — PROGRESS NOTES
"Video-Visit Details    Type of service:  Video Visit    Video Start Time (time video started): 8:30    Video End Time (time video stopped): 8:53    Originating Location (pt. Location): Home    Distant Location (provider location):  [facility] [place]/[facility] location     Mode of Communication:  Video Conference via [employer]      PRE SURGICAL WEIGHT LOSS NUTRITION APPOINTMENT    Bethel Woods  1975  male  [id number]  46 year old    ASSESSMENT    Desired Surgical Procedure: gastric bypass    REASON FOR VISIT:  Bethel Woods is a 46 year old year old male presents today for a pre-surgical weight loss follow-up appointment. Patient accompanied by self.    DIAGNOSIS:  Weight Status Obesity Grade III BMI >40    ANTHROPOMETRICS:  Height: 68\"   Initial Weight: 376.2 lb    Weight last visit: 363 lb    Current weight:  365 lb per pt  BMI: 55.50 kg/(m^2).    VITAMINS AND MINERALS:   1 Multivitamin with Minerals (Childrens chewable complete with 10 mg iron) -7 am after work and 9 pm  650 mg Calcium Citrate chew with Vitamin D BID at non 9:00 pm  5000 International units Vitamin D-noon  1000 mg vitamin C      NUTRITION HISTORY:  Breakfast: 1 slice toast with butter  Lunch: protein drink with 26 grams protein (Muscle Milk)  Supper: chicken or steak, cooked veggie  Snacks:  Protein drinks with 26 grams protein (Muscle Milk)  Fluids Consumed: Water, Crystal Light and Protein Drink  Eating slower: Yes  Chewing foods thoroughly: Yes  Take 20-30 minutes to consume each meal: Yes  Fluids and meals separate by at least 30 minutes: No  Carbonation: no  Caffeine: no  Additional Information: Weight loss surgery is scheduled for 5/26/22. Patient wants to lose more weight prior to surgery and would like to try to follow the modified liquid diet as needed. Overall he is feeling excited about moving forward with weight loss surgery. He is confident he can keep his weight at 366 or below up until the day " "of surgery. Patient asking if after surgery he could try IV Bar (IV fluids at home with vitamin b12). Advised against this practice of getting IV fluids at home as it is critical that you learn to drink adequate amount s after surgery. If IV fluids are indicated our team will work with you after surgery.     PHYSICAL ACTIVITY:  Type: walking  Frequency: 2 (days per week)  Duration:  30-45 (minutes)     DIAGNOSIS:  Previous Nutrition Diagnosis: Obesity related to long history of self- monitoring deficit and excessive energy intake evidenced by BMI of 55.19 kg/m2  No change, modified below    Previous goals:   Continue to practice all per-operative behavior changes-improving       Current Nutrition Diagnosis: Obesity related to long history of self-monitoring deficit and excessive energy intake as evidenced by BMI of 55.50 kg/m2.    INTERVENTION:  Nutrition Prescription: Recommended energy/nutrient modification.    GOALS:  Take calcium at least 2 hours away from multivitamin/ mineral with iron  Set timers or alerts to help remember to stop drinking 30 minutes before and after meals (look at Bariatric apps)  Follow \"modified liquid diet\" as able/ keep weight at or under 366 lb (sent via [employer])    Intervention:  - Discussed progress towards previous goals.  - Reinforced importance of making behavior changes in preparation for bariatric surgery.   - Assessed learning needs and learning preferences       NUTRITION MONITORING AND EVALUATION:  Anticipated compliance: fair-good  Patient demonstrated good understanding.   *Patient has met pre bariatric surgery diet requirements    Follow up: Continue to monitor patient closely regarding weight loss and diet.  # of visits needed: 0 (unless unable to keep weight at goal weight of 336 lb)  Cleared by RD: Yes  (previously cleared)    TIME SPENT WITH PATIENT: 22  minutes  Luisito Hair RD, BERTA  [facility], [place]  "

## 2022-04-06 ENCOUNTER — VIRTUAL VISIT (OUTPATIENT)
Dept: SURGERY | Facility: CLINIC | Age: 47
End: 2022-04-06
Payer: COMMERCIAL

## 2022-04-06 VITALS — BODY MASS INDEX: 55.5 KG/M2 | WEIGHT: 315 LBS

## 2022-04-06 DIAGNOSIS — E66.01 MORBID OBESITY (H): ICD-10-CM

## 2022-04-06 PROCEDURE — 97803 MED NUTRITION INDIV SUBSEQ: CPT | Mod: 95 | Performed by: DIETITIAN, REGISTERED

## 2022-04-06 NOTE — PATIENT INSTRUCTIONS
"Gopi Hugo-   It was great to visit with you and learn about your progress! Below are the goals we discussed.    GOALS:  Take calcium at least 2 hours away from multivitamin/ mineral with iron  Set timers or alerts to help remember to stop drinking 30 minutes before and after meals (look at Bariatric apps)  Follow \"modified liquid diet\" as able/ keep weight at or under 366 lb (sent via [employer])    Nutrition Educational Materials:    Modified Liquid Diet (2 liquid meals, 1 meal, 2 snacks)  [email or web address]      Your next visits with a Dietitian have already been scheduled for after surgery. If you are struggling to keep your weight at  </= 366 lb please schedule another visit with a RD by [phone].  Thanks!  Luisito Hair RD, [facility], [place]     "

## 2022-05-02 DIAGNOSIS — Z11.59 ENCOUNTER FOR SCREENING FOR OTHER VIRAL DISEASES: Primary | ICD-10-CM

## 2022-05-04 ENCOUNTER — TRANSFERRED RECORDS (OUTPATIENT)
Dept: HEALTH INFORMATION MANAGEMENT | Facility: CLINIC | Age: 47
End: 2022-05-04
Payer: COMMERCIAL

## 2022-05-04 LAB
CREATININE (EXTERNAL): 0.8 MG/DL (ref 0.5–1.2)
GFR ESTIMATED (EXTERNAL): 105 ML/MIN/1.73
GFR ESTIMATED (IF AFRICAN AMERICAN) (EXTERNAL): 122 ML/MIN/1.73
GLUCOSE (EXTERNAL): 88 MG/DL (ref 65–99)
POTASSIUM (EXTERNAL): 4 MMOL/L (ref 3.5–5)

## 2022-05-24 ENCOUNTER — LAB (OUTPATIENT)
Dept: LAB | Facility: OTHER | Age: 47
End: 2022-05-24
Payer: COMMERCIAL

## 2022-05-24 DIAGNOSIS — Z11.59 ENCOUNTER FOR SCREENING FOR OTHER VIRAL DISEASES: ICD-10-CM

## 2022-05-24 PROCEDURE — U0003 INFECTIOUS AGENT DETECTION BY NUCLEIC ACID (DNA OR RNA); SEVERE ACUTE RESPIRATORY SYNDROME CORONAVIRUS 2 (SARS-COV-2) (CORONAVIRUS DISEASE [COVID-19]), AMPLIFIED PROBE TECHNIQUE, MAKING USE OF HIGH THROUGHPUT TECHNOLOGIES AS DESCRIBED BY CMS-2020-01-R: HCPCS

## 2022-05-24 PROCEDURE — U0005 INFEC AGEN DETEC AMPLI PROBE: HCPCS

## 2022-05-25 ENCOUNTER — ANESTHESIA EVENT (OUTPATIENT)
Dept: SURGERY | Facility: CLINIC | Age: 47
DRG: 620 | End: 2022-05-25
Payer: COMMERCIAL

## 2022-05-25 LAB — SARS-COV-2 RNA RESP QL NAA+PROBE: NEGATIVE

## 2022-05-25 RX ORDER — FLUTICASONE PROPIONATE AND SALMETEROL XINAFOATE 230; 21 UG/1; UG/1
2 AEROSOL, METERED RESPIRATORY (INHALATION) 2 TIMES DAILY
COMMUNITY
End: 2024-07-12

## 2022-05-25 RX ORDER — TIZANIDINE HYDROCHLORIDE 2 MG/1
2 CAPSULE, GELATIN COATED ORAL PRN
COMMUNITY
End: 2022-08-29

## 2022-05-25 ASSESSMENT — ENCOUNTER SYMPTOMS: DYSRHYTHMIAS: 1

## 2022-05-25 ASSESSMENT — LIFESTYLE VARIABLES: TOBACCO_USE: 0

## 2022-05-25 NOTE — PROGRESS NOTES
PTA medications updated by Medication Scribe prior to surgery via phone call with patient (last doses completed by Nurse)     Medication history sources: Patient, Surescripts and H&P  In the past week, patient estimated taking medication this percent of the time: Greater than 90%  Adherence assessment: N/A Not Observed    Significant changes made to the medication list:  Patient reports no longer taking the following meds (med scribe removed from PTA med list): Acetaminophen, Excedrin      Additional medication history information:   Patient was advised to bring: Albuterol Inhaler, Advair Inhaler    Medication reconciliation completed by provider prior to medication history? No    Time spent in this activity: 40 minutes    The information provided in this note is only as accurate as the sources available at the time of update(s)    Prior to Admission medications    Medication Sig Last Dose Taking? Auth Provider   albuterol (PROAIR HFA/PROVENTIL HFA/VENTOLIN HFA) 108 (90 Base) MCG/ACT inhaler Inhale 2 puffs into the lungs every 4 hours as needed for shortness of breath / dyspnea or wheezing  at prn Yes Reported, Patient   ALPRAZolam (XANAX) 0.5 MG tablet Take 0.5 mg by mouth daily as needed for anxiety  at prn Yes Reported, Patient   apixaban ANTICOAGULANT (ELIQUIS) 5 MG tablet Take 5 mg by mouth 2 times daily 5/22/2022 at pm Yes Reported, Patient   calcium carbonate (OS-SALLY) 1500 (600 Ca) MG tablet Take 600 mg by mouth 2 times daily (with meals) 5/25/2022 at am Yes Reported, Patient   ferrous sulfate (FEROSUL) 325 (65 Fe) MG tablet Take 325 mg by mouth daily (with breakfast) 5/25/2022 at am Yes Reported, Patient   flecainide (TAMBOCOR) 50 MG tablet Take 100 mg by mouth 2 times daily 5/25/2022 at pm Yes Reported, Patient   fluticasone-salmeterol (ADVAIR-HFA) 230-21 MCG/ACT inhaler Inhale 2 puffs into the lungs 2 times daily  at pm Yes Reported, Patient   lisinopril-hydrochlorothiazide (ZESTORETIC) 20-25 MG tablet  Take 1 tablet by mouth daily  5/25/2022 at pm Yes Reported, Patient   metoprolol succinate ER (TOPROL XL) 100 MG 24 hr tablet Take 50 mg by mouth 2 times daily (0.5 x 100 mg = 50 mg)  at am Yes Reported, Patient   PARoxetine (PAXIL) 40 MG tablet Take 1 tablet by mouth daily  5/25/2022 at am Yes Reported, Patient   Pediatric Multiple Vitamins (CHILDRENS MULTIVITAMINS PO) Take 1 chew tab by mouth daily 5/25/2022 at am Yes Reported, Patient   tiZANidine (ZANAFLEX) 2 MG capsule Take 2 mg by mouth as needed (for muscle spasms)  at prn Yes Reported, Patient   vitamin C (ASCORBIC ACID) 500 MG tablet Take 1,000 mg by mouth daily  5/25/2022 at am Yes Reported, Patient   Vitamin D3 (CHOLECALCIFEROL) 125 MCG (5000 UT) tablet Take 125 mcg by mouth daily 5/25/2022 at am Yes Reported, Patient     Medication history completed by:    Gurinder Bull CPhT  Medication [facility]

## 2022-05-26 ENCOUNTER — HOSPITAL ENCOUNTER (INPATIENT)
Facility: CLINIC | Age: 47
LOS: 1 days | Discharge: HOME | DRG: 620 | End: 2022-05-27
Attending: SURGERY | Admitting: SURGERY
Payer: COMMERCIAL

## 2022-05-26 ENCOUNTER — ANESTHESIA (OUTPATIENT)
Dept: SURGERY | Facility: CLINIC | Age: 47
DRG: 620 | End: 2022-05-26
Payer: COMMERCIAL

## 2022-05-26 DIAGNOSIS — E66.01 MORBID OBESITY WITH BMI OF 50.0-59.9, ADULT (H): Primary | ICD-10-CM

## 2022-05-26 DIAGNOSIS — Z98.84 BARIATRIC SURGERY STATUS: ICD-10-CM

## 2022-05-26 LAB
CREAT SERPL-MCNC: 0.82 MG/DL (ref 0.66–1.25)
GFR SERPL CREATININE-BSD FRML MDRD: >90 ML/MIN/1.73M2
PLATELET # BLD AUTO: 350 10E3/UL (ref 150–450)
POTASSIUM BLD-SCNC: 3.6 MMOL/L (ref 3.4–5.3)

## 2022-05-26 PROCEDURE — 250N000011 HC RX IP 250 OP 636: Performed by: SURGERY

## 2022-05-26 PROCEDURE — 258N000003 HC RX IP 258 OP 636: Performed by: NURSE ANESTHETIST, CERTIFIED REGISTERED

## 2022-05-26 PROCEDURE — 43644 LAP GASTRIC BYPASS/ROUX-EN-Y: CPT | Mod: AS | Performed by: PHYSICIAN ASSISTANT

## 2022-05-26 PROCEDURE — 258N000003 HC RX IP 258 OP 636: Performed by: ANESTHESIOLOGY

## 2022-05-26 PROCEDURE — 85049 AUTOMATED PLATELET COUNT: CPT

## 2022-05-26 PROCEDURE — 258N000003 HC RX IP 258 OP 636: Performed by: PHYSICIAN ASSISTANT

## 2022-05-26 PROCEDURE — 272N000001 HC OR GENERAL SUPPLY STERILE: Performed by: SURGERY

## 2022-05-26 PROCEDURE — 120N000001 HC R&B MED SURG/OB

## 2022-05-26 PROCEDURE — 360N000077 HC SURGERY LEVEL 4, PER MIN: Performed by: SURGERY

## 2022-05-26 PROCEDURE — 250N000025 HC SEVOFLURANE, PER MIN: Performed by: SURGERY

## 2022-05-26 PROCEDURE — 82565 ASSAY OF CREATININE: CPT | Performed by: PHYSICIAN ASSISTANT

## 2022-05-26 PROCEDURE — 258N000001 HC RX 258: Performed by: SURGERY

## 2022-05-26 PROCEDURE — 84132 ASSAY OF SERUM POTASSIUM: CPT | Performed by: ANESTHESIOLOGY

## 2022-05-26 PROCEDURE — 250N000009 HC RX 250: Performed by: SURGERY

## 2022-05-26 PROCEDURE — 999N000141 HC STATISTIC PRE-PROCEDURE NURSING ASSESSMENT: Performed by: SURGERY

## 2022-05-26 PROCEDURE — 43644 LAP GASTRIC BYPASS/ROUX-EN-Y: CPT | Performed by: SURGERY

## 2022-05-26 PROCEDURE — 250N000013 HC RX MED GY IP 250 OP 250 PS 637: Performed by: SURGERY

## 2022-05-26 PROCEDURE — 999N000157 HC STATISTIC RCP TIME EA 10 MIN

## 2022-05-26 PROCEDURE — 370N000017 HC ANESTHESIA TECHNICAL FEE, PER MIN: Performed by: SURGERY

## 2022-05-26 PROCEDURE — 710N000009 HC RECOVERY PHASE 1, LEVEL 1, PER MIN: Performed by: SURGERY

## 2022-05-26 PROCEDURE — 0D164ZA BYPASS STOMACH TO JEJUNUM, PERCUTANEOUS ENDOSCOPIC APPROACH: ICD-10-PCS | Performed by: SURGERY

## 2022-05-26 PROCEDURE — 250N000011 HC RX IP 250 OP 636: Performed by: PHYSICIAN ASSISTANT

## 2022-05-26 PROCEDURE — 250N000013 HC RX MED GY IP 250 OP 250 PS 637: Performed by: PHYSICIAN ASSISTANT

## 2022-05-26 PROCEDURE — 250N000009 HC RX 250: Performed by: NURSE ANESTHETIST, CERTIFIED REGISTERED

## 2022-05-26 PROCEDURE — 36415 COLL VENOUS BLD VENIPUNCTURE: CPT | Performed by: PHYSICIAN ASSISTANT

## 2022-05-26 PROCEDURE — 250N000011 HC RX IP 250 OP 636: Performed by: NURSE ANESTHETIST, CERTIFIED REGISTERED

## 2022-05-26 PROCEDURE — 36415 COLL VENOUS BLD VENIPUNCTURE: CPT | Performed by: ANESTHESIOLOGY

## 2022-05-26 PROCEDURE — 250N000011 HC RX IP 250 OP 636: Performed by: ANESTHESIOLOGY

## 2022-05-26 RX ORDER — TIZANIDINE 2 MG/1
2 TABLET ORAL EVERY 6 HOURS PRN
Status: DISCONTINUED | OUTPATIENT
Start: 2022-05-27 | End: 2022-05-27 | Stop reason: HOSPADM

## 2022-05-26 RX ORDER — PAROXETINE 20 MG/1
40 TABLET, FILM COATED ORAL DAILY
Status: DISCONTINUED | OUTPATIENT
Start: 2022-05-27 | End: 2022-05-27 | Stop reason: HOSPADM

## 2022-05-26 RX ORDER — ONDANSETRON 4 MG/1
4 TABLET, ORALLY DISINTEGRATING ORAL EVERY 30 MIN PRN
Status: DISCONTINUED | OUTPATIENT
Start: 2022-05-26 | End: 2022-05-26 | Stop reason: HOSPADM

## 2022-05-26 RX ORDER — HEPARIN SODIUM 5000 [USP'U]/.5ML
5000 INJECTION, SOLUTION INTRAVENOUS; SUBCUTANEOUS
Status: COMPLETED | OUTPATIENT
Start: 2022-05-26 | End: 2022-05-26

## 2022-05-26 RX ORDER — NALOXONE HYDROCHLORIDE 0.4 MG/ML
0.2 INJECTION, SOLUTION INTRAMUSCULAR; INTRAVENOUS; SUBCUTANEOUS
Status: DISCONTINUED | OUTPATIENT
Start: 2022-05-26 | End: 2022-05-27 | Stop reason: HOSPADM

## 2022-05-26 RX ORDER — OXYCODONE HYDROCHLORIDE 5 MG/1
5 TABLET ORAL
Status: DISCONTINUED | OUTPATIENT
Start: 2022-05-26 | End: 2022-05-27 | Stop reason: HOSPADM

## 2022-05-26 RX ORDER — CEFAZOLIN SODIUM/WATER 3 G/30 ML
3 SYRINGE (ML) INTRAVENOUS
Status: COMPLETED | OUTPATIENT
Start: 2022-05-26 | End: 2022-05-26

## 2022-05-26 RX ORDER — ENALAPRILAT 1.25 MG/ML
1.25 INJECTION INTRAVENOUS EVERY 6 HOURS PRN
Status: DISCONTINUED | OUTPATIENT
Start: 2022-05-26 | End: 2022-05-27 | Stop reason: HOSPADM

## 2022-05-26 RX ORDER — OXYCODONE HYDROCHLORIDE 5 MG/1
10 TABLET ORAL
Status: DISCONTINUED | OUTPATIENT
Start: 2022-05-26 | End: 2022-05-27 | Stop reason: HOSPADM

## 2022-05-26 RX ORDER — MAGNESIUM HYDROXIDE 1200 MG/15ML
LIQUID ORAL PRN
Status: DISCONTINUED | OUTPATIENT
Start: 2022-05-26 | End: 2022-05-26 | Stop reason: HOSPADM

## 2022-05-26 RX ORDER — HYDROMORPHONE HYDROCHLORIDE 1 MG/ML
0.5 INJECTION, SOLUTION INTRAMUSCULAR; INTRAVENOUS; SUBCUTANEOUS
Status: DISCONTINUED | OUTPATIENT
Start: 2022-05-26 | End: 2022-05-27 | Stop reason: HOSPADM

## 2022-05-26 RX ORDER — EPHEDRINE SULFATE 50 MG/ML
INJECTION, SOLUTION INTRAMUSCULAR; INTRAVENOUS; SUBCUTANEOUS PRN
Status: DISCONTINUED | OUTPATIENT
Start: 2022-05-26 | End: 2022-05-26

## 2022-05-26 RX ORDER — ONDANSETRON 2 MG/ML
4 INJECTION INTRAMUSCULAR; INTRAVENOUS
Status: DISCONTINUED | OUTPATIENT
Start: 2022-05-26 | End: 2022-05-26 | Stop reason: HOSPADM

## 2022-05-26 RX ORDER — FENTANYL CITRATE 50 UG/ML
INJECTION, SOLUTION INTRAMUSCULAR; INTRAVENOUS PRN
Status: DISCONTINUED | OUTPATIENT
Start: 2022-05-26 | End: 2022-05-26

## 2022-05-26 RX ORDER — LABETALOL HYDROCHLORIDE 5 MG/ML
5-10 INJECTION, SOLUTION INTRAVENOUS
Status: DISCONTINUED | OUTPATIENT
Start: 2022-05-26 | End: 2022-05-27 | Stop reason: HOSPADM

## 2022-05-26 RX ORDER — PROPOFOL 10 MG/ML
INJECTION, EMULSION INTRAVENOUS PRN
Status: DISCONTINUED | OUTPATIENT
Start: 2022-05-26 | End: 2022-05-26

## 2022-05-26 RX ORDER — ONDANSETRON 2 MG/ML
INJECTION INTRAMUSCULAR; INTRAVENOUS PRN
Status: DISCONTINUED | OUTPATIENT
Start: 2022-05-26 | End: 2022-05-26

## 2022-05-26 RX ORDER — SODIUM CHLORIDE, SODIUM LACTATE, POTASSIUM CHLORIDE, CALCIUM CHLORIDE 600; 310; 30; 20 MG/100ML; MG/100ML; MG/100ML; MG/100ML
INJECTION, SOLUTION INTRAVENOUS CONTINUOUS
Status: DISCONTINUED | OUTPATIENT
Start: 2022-05-26 | End: 2022-05-27 | Stop reason: HOSPADM

## 2022-05-26 RX ORDER — FLECAINIDE ACETATE 100 MG/1
100 TABLET ORAL 2 TIMES DAILY
Status: DISCONTINUED | OUTPATIENT
Start: 2022-05-27 | End: 2022-05-27 | Stop reason: HOSPADM

## 2022-05-26 RX ORDER — DIPHENHYDRAMINE HCL 25 MG
25 CAPSULE ORAL EVERY 6 HOURS PRN
Status: DISCONTINUED | OUTPATIENT
Start: 2022-05-26 | End: 2022-05-27 | Stop reason: HOSPADM

## 2022-05-26 RX ORDER — DIPHENHYDRAMINE HYDROCHLORIDE 50 MG/ML
25 INJECTION INTRAMUSCULAR; INTRAVENOUS EVERY 6 HOURS PRN
Status: DISCONTINUED | OUTPATIENT
Start: 2022-05-26 | End: 2022-05-27 | Stop reason: HOSPADM

## 2022-05-26 RX ORDER — ACETAMINOPHEN 325 MG/1
650 TABLET ORAL EVERY 4 HOURS PRN
Status: DISCONTINUED | OUTPATIENT
Start: 2022-05-26 | End: 2022-05-27 | Stop reason: HOSPADM

## 2022-05-26 RX ORDER — METOPROLOL TARTRATE 50 MG
50 TABLET ORAL 2 TIMES DAILY
Status: DISCONTINUED | OUTPATIENT
Start: 2022-05-27 | End: 2022-05-27 | Stop reason: HOSPADM

## 2022-05-26 RX ORDER — LIDOCAINE 40 MG/G
CREAM TOPICAL
Status: DISCONTINUED | OUTPATIENT
Start: 2022-05-26 | End: 2022-05-27 | Stop reason: HOSPADM

## 2022-05-26 RX ORDER — ONDANSETRON 2 MG/ML
4 INJECTION INTRAMUSCULAR; INTRAVENOUS EVERY 30 MIN PRN
Status: DISCONTINUED | OUTPATIENT
Start: 2022-05-26 | End: 2022-05-26 | Stop reason: HOSPADM

## 2022-05-26 RX ORDER — NALOXONE HYDROCHLORIDE 0.4 MG/ML
0.4 INJECTION, SOLUTION INTRAMUSCULAR; INTRAVENOUS; SUBCUTANEOUS
Status: DISCONTINUED | OUTPATIENT
Start: 2022-05-26 | End: 2022-05-27 | Stop reason: HOSPADM

## 2022-05-26 RX ORDER — FENTANYL CITRATE 0.05 MG/ML
25 INJECTION, SOLUTION INTRAMUSCULAR; INTRAVENOUS EVERY 5 MIN PRN
Status: DISCONTINUED | OUTPATIENT
Start: 2022-05-26 | End: 2022-05-26 | Stop reason: HOSPADM

## 2022-05-26 RX ORDER — ONDANSETRON 4 MG/1
4 TABLET, ORALLY DISINTEGRATING ORAL EVERY 6 HOURS PRN
Status: DISCONTINUED | OUTPATIENT
Start: 2022-05-26 | End: 2022-05-27 | Stop reason: HOSPADM

## 2022-05-26 RX ORDER — CEFAZOLIN SODIUM/WATER 3 G/30 ML
3 SYRINGE (ML) INTRAVENOUS SEE ADMIN INSTRUCTIONS
Status: DISCONTINUED | OUTPATIENT
Start: 2022-05-26 | End: 2022-05-26 | Stop reason: HOSPADM

## 2022-05-26 RX ORDER — SODIUM CHLORIDE, SODIUM LACTATE, POTASSIUM CHLORIDE, CALCIUM CHLORIDE 600; 310; 30; 20 MG/100ML; MG/100ML; MG/100ML; MG/100ML
INJECTION, SOLUTION INTRAVENOUS CONTINUOUS
Status: DISCONTINUED | OUTPATIENT
Start: 2022-05-26 | End: 2022-05-26 | Stop reason: HOSPADM

## 2022-05-26 RX ORDER — OXYCODONE HYDROCHLORIDE 5 MG/1
5 TABLET ORAL EVERY 4 HOURS PRN
Status: CANCELLED | OUTPATIENT
Start: 2022-05-26

## 2022-05-26 RX ORDER — ACETAMINOPHEN 325 MG/1
975 TABLET ORAL ONCE
Status: COMPLETED | OUTPATIENT
Start: 2022-05-26 | End: 2022-05-26

## 2022-05-26 RX ORDER — PROCHLORPERAZINE MALEATE 10 MG
10 TABLET ORAL EVERY 6 HOURS PRN
Status: DISCONTINUED | OUTPATIENT
Start: 2022-05-26 | End: 2022-05-27 | Stop reason: HOSPADM

## 2022-05-26 RX ORDER — ONDANSETRON 2 MG/ML
4 INJECTION INTRAMUSCULAR; INTRAVENOUS EVERY 6 HOURS PRN
Status: DISCONTINUED | OUTPATIENT
Start: 2022-05-26 | End: 2022-05-27 | Stop reason: HOSPADM

## 2022-05-26 RX ORDER — HYDROMORPHONE HCL IN WATER/PF 6 MG/30 ML
0.2 PATIENT CONTROLLED ANALGESIA SYRINGE INTRAVENOUS EVERY 5 MIN PRN
Status: DISCONTINUED | OUTPATIENT
Start: 2022-05-26 | End: 2022-05-26 | Stop reason: HOSPADM

## 2022-05-26 RX ORDER — ALBUTEROL SULFATE 90 UG/1
2 AEROSOL, METERED RESPIRATORY (INHALATION) EVERY 4 HOURS PRN
Status: DISCONTINUED | OUTPATIENT
Start: 2022-05-26 | End: 2022-05-27 | Stop reason: HOSPADM

## 2022-05-26 RX ORDER — PROPOFOL 10 MG/ML
INJECTION, EMULSION INTRAVENOUS CONTINUOUS PRN
Status: DISCONTINUED | OUTPATIENT
Start: 2022-05-26 | End: 2022-05-26

## 2022-05-26 RX ORDER — DEXAMETHASONE SODIUM PHOSPHATE 4 MG/ML
INJECTION, SOLUTION INTRA-ARTICULAR; INTRALESIONAL; INTRAMUSCULAR; INTRAVENOUS; SOFT TISSUE PRN
Status: DISCONTINUED | OUTPATIENT
Start: 2022-05-26 | End: 2022-05-26

## 2022-05-26 RX ORDER — LIDOCAINE HYDROCHLORIDE 20 MG/ML
INJECTION, SOLUTION INFILTRATION; PERINEURAL PRN
Status: DISCONTINUED | OUTPATIENT
Start: 2022-05-26 | End: 2022-05-26

## 2022-05-26 RX ORDER — FLUTICASONE PROPIONATE AND SALMETEROL XINAFOATE 230; 21 UG/1; UG/1
2 AEROSOL, METERED RESPIRATORY (INHALATION) EVERY 12 HOURS
Status: DISCONTINUED | OUTPATIENT
Start: 2022-05-26 | End: 2022-05-27 | Stop reason: HOSPADM

## 2022-05-26 RX ORDER — ENOXAPARIN SODIUM 100 MG/ML
40 INJECTION SUBCUTANEOUS EVERY 12 HOURS
Status: DISCONTINUED | OUTPATIENT
Start: 2022-05-27 | End: 2022-05-27 | Stop reason: HOSPADM

## 2022-05-26 RX ORDER — BUPIVACAINE HYDROCHLORIDE AND EPINEPHRINE 2.5; 5 MG/ML; UG/ML
INJECTION, SOLUTION INFILTRATION; PERINEURAL PRN
Status: DISCONTINUED | OUTPATIENT
Start: 2022-05-26 | End: 2022-05-26 | Stop reason: HOSPADM

## 2022-05-26 RX ORDER — ALPRAZOLAM 0.25 MG
0.5 TABLET ORAL DAILY PRN
Status: DISCONTINUED | OUTPATIENT
Start: 2022-05-27 | End: 2022-05-27 | Stop reason: HOSPADM

## 2022-05-26 RX ADMIN — SUGAMMADEX 500 MG: 100 INJECTION, SOLUTION INTRAVENOUS at 11:28

## 2022-05-26 RX ADMIN — ONDANSETRON 4 MG: 2 INJECTION INTRAMUSCULAR; INTRAVENOUS at 08:15

## 2022-05-26 RX ADMIN — ROCURONIUM BROMIDE 10 MG: 50 INJECTION, SOLUTION INTRAVENOUS at 10:15

## 2022-05-26 RX ADMIN — SODIUM CHLORIDE, POTASSIUM CHLORIDE, SODIUM LACTATE AND CALCIUM CHLORIDE: 600; 310; 30; 20 INJECTION, SOLUTION INTRAVENOUS at 13:10

## 2022-05-26 RX ADMIN — MIDAZOLAM 2 MG: 1 INJECTION INTRAMUSCULAR; INTRAVENOUS at 07:57

## 2022-05-26 RX ADMIN — SODIUM CHLORIDE, POTASSIUM CHLORIDE, SODIUM LACTATE AND CALCIUM CHLORIDE: 600; 310; 30; 20 INJECTION, SOLUTION INTRAVENOUS at 09:30

## 2022-05-26 RX ADMIN — SODIUM CHLORIDE, POTASSIUM CHLORIDE, SODIUM LACTATE AND CALCIUM CHLORIDE: 600; 310; 30; 20 INJECTION, SOLUTION INTRAVENOUS at 18:05

## 2022-05-26 RX ADMIN — PHENYLEPHRINE HYDROCHLORIDE 0.5 MCG/KG/MIN: 10 INJECTION INTRAVENOUS at 08:48

## 2022-05-26 RX ADMIN — SODIUM CHLORIDE, POTASSIUM CHLORIDE, SODIUM LACTATE AND CALCIUM CHLORIDE: 600; 310; 30; 20 INJECTION, SOLUTION INTRAVENOUS at 11:25

## 2022-05-26 RX ADMIN — FAMOTIDINE 20 MG: 10 INJECTION, SOLUTION INTRAVENOUS at 07:49

## 2022-05-26 RX ADMIN — SODIUM CHLORIDE, POTASSIUM CHLORIDE, SODIUM LACTATE AND CALCIUM CHLORIDE: 600; 310; 30; 20 INJECTION, SOLUTION INTRAVENOUS at 06:42

## 2022-05-26 RX ADMIN — HYDROMORPHONE HYDROCHLORIDE 0.5 MG: 1 INJECTION, SOLUTION INTRAMUSCULAR; INTRAVENOUS; SUBCUTANEOUS at 17:26

## 2022-05-26 RX ADMIN — Medication 650 MG: at 20:12

## 2022-05-26 RX ADMIN — Medication 975 MG: at 07:00

## 2022-05-26 RX ADMIN — HYDROMORPHONE HYDROCHLORIDE 0.5 MG: 1 INJECTION, SOLUTION INTRAMUSCULAR; INTRAVENOUS; SUBCUTANEOUS at 13:44

## 2022-05-26 RX ADMIN — DEXAMETHASONE SODIUM PHOSPHATE 4 MG: 4 INJECTION, SOLUTION INTRA-ARTICULAR; INTRALESIONAL; INTRAMUSCULAR; INTRAVENOUS; SOFT TISSUE at 08:10

## 2022-05-26 RX ADMIN — PROPOFOL 20 MCG/KG/MIN: 10 INJECTION, EMULSION INTRAVENOUS at 08:40

## 2022-05-26 RX ADMIN — SUCCINYLCHOLINE CHLORIDE 140 MG: 20 INJECTION, SOLUTION INTRAMUSCULAR; INTRAVENOUS; PARENTERAL at 07:57

## 2022-05-26 RX ADMIN — HYOSCYAMINE SULFATE 125 MCG: 0.12 TABLET SUBLINGUAL at 13:44

## 2022-05-26 RX ADMIN — OXYCODONE HYDROCHLORIDE 10 MG: 5 TABLET ORAL at 23:44

## 2022-05-26 RX ADMIN — OXYCODONE HYDROCHLORIDE 5 MG: 5 TABLET ORAL at 15:51

## 2022-05-26 RX ADMIN — PHENYLEPHRINE HYDROCHLORIDE 100 MCG: 10 INJECTION INTRAVENOUS at 08:28

## 2022-05-26 RX ADMIN — ROCURONIUM BROMIDE 10 MG: 50 INJECTION, SOLUTION INTRAVENOUS at 10:00

## 2022-05-26 RX ADMIN — Medication 650 MG: at 23:44

## 2022-05-26 RX ADMIN — ROCURONIUM BROMIDE 20 MG: 50 INJECTION, SOLUTION INTRAVENOUS at 09:23

## 2022-05-26 RX ADMIN — PHENYLEPHRINE HYDROCHLORIDE 100 MCG: 10 INJECTION INTRAVENOUS at 08:46

## 2022-05-26 RX ADMIN — ONDANSETRON 4 MG: 2 INJECTION INTRAMUSCULAR; INTRAVENOUS at 11:12

## 2022-05-26 RX ADMIN — Medication 5 MG: at 08:16

## 2022-05-26 RX ADMIN — FLUTICASONE PROPIONATE AND SALMETEROL XINAFOATE 2 PUFF: 230; 21 AEROSOL, METERED RESPIRATORY (INHALATION) at 20:39

## 2022-05-26 RX ADMIN — LIDOCAINE HYDROCHLORIDE 100 MG: 20 INJECTION, SOLUTION INFILTRATION; PERINEURAL at 07:57

## 2022-05-26 RX ADMIN — FENTANYL CITRATE 25 MCG: 50 INJECTION, SOLUTION INTRAMUSCULAR; INTRAVENOUS at 12:21

## 2022-05-26 RX ADMIN — ROCURONIUM BROMIDE 20 MG: 50 INJECTION, SOLUTION INTRAVENOUS at 08:55

## 2022-05-26 RX ADMIN — PHENYLEPHRINE HYDROCHLORIDE 50 MCG: 10 INJECTION INTRAVENOUS at 11:17

## 2022-05-26 RX ADMIN — ROCURONIUM BROMIDE 50 MG: 50 INJECTION, SOLUTION INTRAVENOUS at 08:04

## 2022-05-26 RX ADMIN — Medication 3 G: at 07:49

## 2022-05-26 RX ADMIN — PHENYLEPHRINE HYDROCHLORIDE 100 MCG: 10 INJECTION INTRAVENOUS at 08:19

## 2022-05-26 RX ADMIN — PHENYLEPHRINE HYDROCHLORIDE 100 MCG: 10 INJECTION INTRAVENOUS at 09:17

## 2022-05-26 RX ADMIN — OXYCODONE HYDROCHLORIDE 10 MG: 5 TABLET ORAL at 20:11

## 2022-05-26 RX ADMIN — HEPARIN SODIUM 5000 UNITS: 5000 INJECTION, SOLUTION INTRAVENOUS; SUBCUTANEOUS at 07:04

## 2022-05-26 RX ADMIN — Medication 5 MG: at 11:13

## 2022-05-26 RX ADMIN — DEXMEDETOMIDINE HYDROCHLORIDE 0.25 MCG/KG/HR: 100 INJECTION, SOLUTION INTRAVENOUS at 08:03

## 2022-05-26 RX ADMIN — FENTANYL CITRATE 100 MCG: 50 INJECTION, SOLUTION INTRAMUSCULAR; INTRAVENOUS at 07:57

## 2022-05-26 RX ADMIN — PROPOFOL 300 MG: 10 INJECTION, EMULSION INTRAVENOUS at 07:57

## 2022-05-26 RX ADMIN — ROCURONIUM BROMIDE 10 MG: 50 INJECTION, SOLUTION INTRAVENOUS at 10:45

## 2022-05-26 ASSESSMENT — ACTIVITIES OF DAILY LIVING (ADL)
ADLS_ACUITY_SCORE: 20
ADLS_ACUITY_SCORE: 22
ADLS_ACUITY_SCORE: 20
ADLS_ACUITY_SCORE: 33
ADLS_ACUITY_SCORE: 20
ADLS_ACUITY_SCORE: 22
ADLS_ACUITY_SCORE: 22
ADLS_ACUITY_SCORE: 20
ADLS_ACUITY_SCORE: 22
ADLS_ACUITY_SCORE: 20

## 2022-05-26 NOTE — OP NOTE
Surgeon: Randy Mesa MD   1 st Assistant: Brandon Whitehead PA-C, The physicians assistant was medically necessary for their expertise in camera management, suctioning, suturing, and retraction.    PREOPERATIVE DIAGNOSIS:   Morbid Obesity  Indication for operation:  Body mass index is 54.05 kg/m .  Bethel Woods has been previously unsuccessful with medical treatment for obesity and some of her comorbidities are directly related to obesity  Past Medical History:   Diagnosis Date     Asthma      Hypertension      Irregular heart beat 06/11/2021    Atrial fibrillation S/P Ablation     Obese      Panic attack      Prediabetes        POSTOPERATIVE DIAGNOSIS:   Same    PROCEDURE:   Very Long Limb Laparoscopic Gastric Bypass with Donnie-en-Y Gastrojejunostomy  ANESTHESIA:   General Endotracheal Anesthesia   COMPLICATIONS:   none   EBL:   20 ml   CONSENT:   Bethel Woods was seen and evaluated in the office setting where the procedure was explained to the patient and all questions were answered. An informed consent discussion was held with the patient. Viable alternatives to the proposed procedure, including but not limited to, medical observation under the care of a physician, exercise programs and other weight reductive operative procedures were explained to the patient. Risks to the proposed procedure, including but not limited to bleeding, infection, conversion to open, anastomotic disruption, bile leak, injury to bile ducts, pneumonia, pulmonary embolus, airway complications and death were explained to the patient. The patient understands the above alternatives and risks and has chosen laparoscopic gastric bypass with Donnie-en-Y reconstruction and wishes to proceed.  Events:   The patient was taken to the operating room and placed in the supine position. After successful induction of general endotracheal anesthesia, an orogastric tube was placed to decompress the stomach. The patient was placed in the Lithotomy  position, supine with legs abducted to 30 degrees. Care was taken to pad the exposed extremities. The patient's abdomen was prepped and draped in the normal sterile fashion. A direct visualization trocar was placed in the left subcostal position in the midclavicular line and access to abdominal cavity was obtained under visualization. Pneumoperitoneum was then established without complications. After evaluation of the abdominal contents from this trocar position, four other 12-mm ports were placed under direct visualization. A solution of local anesthetic was used to infiltrate the subcutaneous tissues prior to trocar placement.   We turned our attention to dividing the greater omentum. This was accomplished with the harmonic scalpel. After this, we identified the ligament of Treitz and went 60-cm distal to that. A 3-0 suture was then place to angel the proximal bowel and then we divided the bowel using a 45-mm YONAS stapler. We then measured 145-cm distal on the bowel and oriented our bowel to create the jejunojejunostomy. An enterotomy was created in each limb with the harmonic scalpel and using the triple-staple technique created a side-to-side jejunojejunostomy. The opening was evaluated for hemostasis and care was taken to make sure that the posterior wall of the anastomosis was free. The remaining defect was closed with one more firing of the YONAS stapler.  Mesenteric defect closed with silk suture.  Left lobe of the liver was retracted with a Ambrosio device on the sub-xyphoid location. After decompression of the stomach with the orogastric tube, the orogastric tube and any esophageal probes were removed from the patient. The stomach was grasped using forceps and retracted caudally to expose the phrenoesophageal ligament. This was taken down with the hook electrocautery. A blunt palpation probe was used to expose the esophagus and the left bundle of the esophageal pal. At this point we released our retraction  on the stomach and measured seven centimeters from the Angle of His along the lesser curvature. The Nerve of Latarjet was identified and preserved. We made a window along the lesser curvature with the Harmonic Scalpel and entered the lesser sac. Once the lesser sac was entered, a YONAS staple loads were used to progress cephalad toward the Angle of His. A small gastric pouch was created, approximately 25-30 ml in size. After this was done, the anvil for the 25 mm EEA stapler was placed transorally. The orogastric tube with the anvil was seen in the gastric pouch. The harmonic scalpel was used to make a gastrotomy on the pouch thru the staple line, and the orogastric tube was pulled out of the gastric pouch. The orogastric tube was detached from the anvil and removed leaving the anvil in the gastric pouch.   After this was done we brought the efferent limb all the way up to the gastric pouch, in between our previously created defect in the greater omentum. The staple line on the jejunum was opened using the harmonic scalpel and after enlarging the lateral trocar site, the 25-mm EEA stapler was introduced into the abdomen and into the open jejunum. Using the EEA stapler, we performed our end to side gastrojejunostomy. The stapler was removed and two healthy donuts of tissue were identified. The open-ended jejunum was closed using a YONAS stapler. This amputated piece of jejunum was placed into an endopouch and removed from the abdomen. The gallbladder was inspected and found to be normal.  Having completed our gastrojejunostomy, an orogastric tube was placed by anesthesia and the integrity of the anastomosis was checked. Saline was used to irrigate the pouch to evaluate for hemostasis, and after that, the pouch was insufflated with air and methylene blue. There was no evidence of bleeding, air leak nor liquid leak.   The abdomen was then copiously irrigated and checked for hemostasis. The effluent was evacuated from the  abdomen and then we turned our attention to closure of the trocar sites.   At this point, prior to evacuation of the pneumoperitoneum, we closed the dilated port site with the laparoscopic suture fascial closure device using 0-Vicryl. The pneumoperitoneum was then evacuated. The wounds were irrigated and found to be hemostatic. The skin was closed using 4-0 Monocryl. Steri strips were applied.  Counts reported as correct.  No immediate complications.

## 2022-05-26 NOTE — ANESTHESIA PROCEDURE NOTES
Airway       Patient location during procedure: OR       Procedure Start/Stop Times: 5/26/2022 8:01 AM  Staff -        Performed By: CRNA  Consent for Airway        Urgency: elective  Indications and Patient Condition       Indications for airway management: dameon-procedural       Induction type:intravenous       Mask difficulty assessment: 2 - vent by mask + OA or adjuvant +/- NMBA    Final Airway Details       Final airway type: endotracheal airway       Successful airway: ETT - single and Oral  Endotracheal Airway Details        ETT size (mm): 8.0       Cuffed: yes       Successful intubation technique: video laryngoscopy       VL Blade Size: Glidescope 4       Grade View of Cords: 1       Adjucts: stylet       Position: Right       Measured from: lips       Secured at (cm): 23       Bite block used: None    Post intubation assessment        Placement verified by: capnometry, equal breath sounds and chest rise        Number of attempts at approach: 1       Secured with: pink tape       Ease of procedure: easy       Dentition: Intact and Unchanged    Medication(s) Administered   Medication Administration Time: 5/26/2022 8:01 AM

## 2022-05-26 NOTE — ANESTHESIA POSTPROCEDURE EVALUATION
Patient: Bethel Woods    Procedure: Procedure(s):  LAPAROSCOPIC GASTRIC BYPASS,       Anesthesia Type:  General    Note:  Disposition: Inpatient   Postop Pain Control: Uneventful            Sign Out: Well controlled pain   PONV: No   Neuro/Psych: Uneventful            Sign Out: Acceptable/Baseline neuro status   Airway/Respiratory: Uneventful            Sign Out: Acceptable/Baseline resp. status   CV/Hemodynamics: Uneventful            Sign Out: Acceptable CV status   Other NRE: NONE   DID A NON-ROUTINE EVENT OCCUR? No           Last vitals:  Vitals Value Taken Time   /78 05/26/22 1240   Temp     Pulse 61 05/26/22 1247   Resp 28 05/26/22 1247   SpO2 97 % 05/26/22 1247   Vitals shown include unvalidated device data.    Electronically Signed By: Wei Malcolm MD  May 26, 2022  2:50 PM

## 2022-05-26 NOTE — PLAN OF CARE
Goal Outcome Evaluation: Pt arrived to unit 1200. POD #0 Gastric bypass. VSS, lethargic, but answers questions appropriately. C/o consistent pain; managed with IV Dilaudid, oral Levsin, Oxycodone. ABD binder in place. X6 lap sites- left lateral lap site seeping. Pt declines to get OOB after multiple attempts.

## 2022-05-26 NOTE — BRIEF OP NOTE
[facility]    Brief Operative Note    Pre-operative diagnosis: Morbid obesity (H) [E66.01]  Post-operative diagnosis Morbid Obesity    Procedure: Procedure(s):  LAPAROSCOPIC GASTRIC BYPASS,     Surgeon: Surgeon(s) and Role:     * Randy Mesa MD - Primary     * Brandon Whitehead PA-C - Assisting     Anesthesia: General   Estimated Blood Loss: 10 mL    Drains: None  Specimens: * No specimens in log *  Findings:   None.  Gallbladder WNL.  Complications: None.  Implants: * No implants in log *

## 2022-05-26 NOTE — PROGRESS NOTES
SP[facility]UAL [facility]  [facility] Pre-Op Main OR  PRE-SURGICAL VISIT    Had pre-surgical visit with patient and mother.  Provided spiritual support and prayer.     Radha Awan  Associate   [phone] (pager)  [phone] (office)

## 2022-05-26 NOTE — ANESTHESIA CARE TRANSFER NOTE
Patient: Bethel Woods    Procedure: Procedure(s):  LAPAROSCOPIC GASTRIC BYPASS,       Diagnosis: Morbid obesity (H) [E66.01]  Diagnosis Additional Information: No value filed.    Anesthesia Type:   General     Note:    Oropharynx: oropharynx clear of all foreign objects  Level of Consciousness: awake  Oxygen Supplementation: face mask  Level of Supplemental Oxygen (L/min / FiO2): 6  Independent Airway: airway patency satisfactory and stable  Dentition: dentition unchanged  Vital Signs Stable: post-procedure vital signs reviewed and stable  Report to RN Given: handoff report given  Patient transferred to: PACU    Handoff Report: Identifed the Patient, Identified the Reponsible Provider, Reviewed the pertinent medical history, Discussed the surgical course, Reviewed Intra-OP anesthesia mangement and issues during anesthesia, Set expectations for post-procedure period and Allowed opportunity for questions and acknowledgement of understanding      Vitals:  Vitals Value Taken Time   BP     Temp     Pulse 65 05/26/22 1148   Resp 22 05/26/22 1148   SpO2 96 % 05/26/22 1148   Vitals shown include unvalidated device data.    Electronically Signed By: LOVE Lofton CRNA  May 26, 2022  11:49 AM

## 2022-05-26 NOTE — ANESTHESIA PREPROCEDURE EVALUATION
Anesthesia Pre-Procedure Evaluation    Patient: Bethel Woods   MRN: [id number] : 1975        Procedure : Procedure(s):  LAPAROSCOPIC GASTRIC BYPASS,          Past Medical History:   Diagnosis Date     Asthma      Hypertension      Irregular heart beat 2021    Atrial fibrillation S/P Ablation     Obese      Panic attack      Prediabetes       Past Surgical History:   Procedure Laterality Date     HC OR CATH ABLATION NON-CARDIAC ENDOVASCULAR       VASECTOMY        Allergies   Allergen Reactions     Phentermine      Elevated Blood pressure and panic attacks     Sertraline      Felt like I was going to pass out.      Social History     Tobacco Use     Smoking status: Never Smoker     Smokeless tobacco: Never Used   Substance Use Topics     Alcohol use: Not on file      Wt Readings from Last 1 Encounters:   22 (!) 165.6 kg (365 lb)        Anesthesia Evaluation            ROS/MED HX  ENT/Pulmonary:     (+) sleep apnea, DEEPALI risk factors, snores loudly, hypertension, obese, asthma  (-) tobacco use   Neurologic:       Cardiovascular:     (+) hypertension-----dysrhythmias, a-fib,  (-) irregular heartbeat/palpitations   METS/Exercise Tolerance:     Hematologic:       Musculoskeletal:       GI/Hepatic: Comment: IFG      Renal/Genitourinary:       Endo:     (+) Obesity,     Psychiatric/Substance Use:     (+) psychiatric history     Infectious Disease:       Malignancy:       Other:            Physical Exam    Airway        Mallampati: III   TM distance: > 3 FB   Neck ROM: full   Mouth opening: > 3 cm    Respiratory Devices and Support         Dental     Comment: Front upper right tooth crown    (+) chipped and caps      Cardiovascular   cardiovascular exam normal          Pulmonary   pulmonary exam normal                OUTSIDE LABS:  CBC:   Lab Results   Component Value Date    WBC 13.1 (H) 2021    HGB 14.6 2021    HCT 44.1 2021     2021     BMP:   Lab Results   Component Value  Date     12/27/2021    POTASSIUM 4.4 12/27/2021    CHLORIDE 102 12/27/2021    CO2 29 12/27/2021    BUN 14 12/27/2021    CR 0.77 12/27/2021    GLC 91 12/27/2021     COAGS: No results found for: PTT, INR, FIBR  POC: No results found for: BGM, HCG, HCGS  HEPATIC:   Lab Results   Component Value Date    ALBUMIN 3.6 12/27/2021    PROTTOTAL 8.5 12/27/2021    ALT 31 12/27/2021    AST 19 12/27/2021    ALKPHOS 99 12/27/2021    BILITOTAL 0.3 12/27/2021     OTHER:   Lab Results   Component Value Date    A1C 5.5 12/27/2021    SALLY 9.5 12/27/2021    TSH 1.89 06/03/2009    T4 1.08 06/03/2009       Anesthesia Plan    ASA Status:  3   NPO Status:  NPO Appropriate    Anesthesia Type: General.     - Airway: ETT   Induction: RSI.   Maintenance: Inhalation.   Techniques and Equipment:     - Airway: Video-Laryngoscope, Shoulder Meghann/Ramp       - Drips/Meds: Dexmed. infusion     Consents    Anesthesia Plan(s) and associated risks, benefits, and realistic alternatives discussed. Questions answered and patient/representative(s) expressed understanding.    - Discussed:     - Discussed with:  Patient         Postoperative Care    Pain management: Multi-modal analgesia.   PONV prophylaxis: Ondansetron (or other 5HT-3), Dexamethasone or Solumedrol     Comments:                Wei Malcolm MD

## 2022-05-27 VITALS
TEMPERATURE: 98.4 F | RESPIRATION RATE: 16 BRPM | WEIGHT: 315 LBS | SYSTOLIC BLOOD PRESSURE: 126 MMHG | DIASTOLIC BLOOD PRESSURE: 75 MMHG | HEART RATE: 73 BPM | HEIGHT: 68 IN | OXYGEN SATURATION: 97 % | BODY MASS INDEX: 47.74 KG/M2

## 2022-05-27 PROBLEM — Z98.84 BARIATRIC SURGERY STATUS: Status: ACTIVE | Noted: 2022-05-27

## 2022-05-27 LAB
ANION GAP SERPL CALCULATED.3IONS-SCNC: 7 MMOL/L (ref 3–14)
CHLORIDE BLD-SCNC: 104 MMOL/L (ref 94–109)
CO2 SERPL-SCNC: 26 MMOL/L (ref 20–32)
GLUCOSE BLDC GLUCOMTR-MCNC: 112 MG/DL (ref 70–99)
HGB BLD-MCNC: 12.4 G/DL (ref 13.3–17.7)
POTASSIUM BLD-SCNC: 3.5 MMOL/L (ref 3.4–5.3)
SODIUM SERPL-SCNC: 137 MMOL/L (ref 133–144)

## 2022-05-27 PROCEDURE — 250N000013 HC RX MED GY IP 250 OP 250 PS 637: Performed by: PHYSICIAN ASSISTANT

## 2022-05-27 PROCEDURE — 36415 COLL VENOUS BLD VENIPUNCTURE: CPT | Performed by: PHYSICIAN ASSISTANT

## 2022-05-27 PROCEDURE — 82374 ASSAY BLOOD CARBON DIOXIDE: CPT | Performed by: PHYSICIAN ASSISTANT

## 2022-05-27 PROCEDURE — 250N000011 HC RX IP 250 OP 636: Performed by: PHYSICIAN ASSISTANT

## 2022-05-27 PROCEDURE — 258N000003 HC RX IP 258 OP 636: Performed by: PHYSICIAN ASSISTANT

## 2022-05-27 PROCEDURE — 85018 HEMOGLOBIN: CPT | Performed by: PHYSICIAN ASSISTANT

## 2022-05-27 RX ORDER — METOPROLOL TARTRATE 50 MG
50 TABLET ORAL 2 TIMES DAILY
Qty: 60 TABLET | Refills: 1 | Status: SHIPPED | OUTPATIENT
Start: 2022-05-27 | End: 2022-06-02

## 2022-05-27 RX ORDER — OXYCODONE HYDROCHLORIDE 5 MG/1
5 TABLET ORAL EVERY 4 HOURS PRN
Qty: 15 TABLET | Refills: 0 | Status: SHIPPED | OUTPATIENT
Start: 2022-05-27 | End: 2022-06-02

## 2022-05-27 RX ORDER — ONDANSETRON 4 MG/1
4 TABLET, ORALLY DISINTEGRATING ORAL EVERY 6 HOURS PRN
Qty: 10 TABLET | Refills: 0 | Status: SHIPPED | OUTPATIENT
Start: 2022-05-27 | End: 2022-06-08

## 2022-05-27 RX ADMIN — PAROXETINE HYDROCHLORIDE 40 MG: 20 TABLET, FILM COATED ORAL at 08:44

## 2022-05-27 RX ADMIN — OMEPRAZOLE 20 MG: 20 CAPSULE, DELAYED RELEASE ORAL at 06:38

## 2022-05-27 RX ADMIN — ENOXAPARIN SODIUM 40 MG: 40 INJECTION SUBCUTANEOUS at 08:45

## 2022-05-27 RX ADMIN — OXYCODONE HYDROCHLORIDE 10 MG: 5 TABLET ORAL at 06:39

## 2022-05-27 RX ADMIN — OXYCODONE HYDROCHLORIDE 10 MG: 5 TABLET ORAL at 03:23

## 2022-05-27 RX ADMIN — OXYCODONE HYDROCHLORIDE 10 MG: 5 TABLET ORAL at 11:49

## 2022-05-27 RX ADMIN — METOPROLOL TARTRATE 50 MG: 50 TABLET, FILM COATED ORAL at 08:45

## 2022-05-27 RX ADMIN — SODIUM CHLORIDE, POTASSIUM CHLORIDE, SODIUM LACTATE AND CALCIUM CHLORIDE: 600; 310; 30; 20 INJECTION, SOLUTION INTRAVENOUS at 02:26

## 2022-05-27 RX ADMIN — Medication 650 MG: at 03:27

## 2022-05-27 RX ADMIN — FLUTICASONE PROPIONATE AND SALMETEROL XINAFOATE 2 PUFF: 230; 21 AEROSOL, METERED RESPIRATORY (INHALATION) at 08:48

## 2022-05-27 RX ADMIN — OXYCODONE HYDROCHLORIDE 10 MG: 5 TABLET ORAL at 16:24

## 2022-05-27 ASSESSMENT — ACTIVITIES OF DAILY LIVING (ADL)
ADLS_ACUITY_SCORE: 22
ADLS_ACUITY_SCORE: 20
ADLS_ACUITY_SCORE: 22
ADLS_ACUITY_SCORE: 20
ADLS_ACUITY_SCORE: 20
ADLS_ACUITY_SCORE: 22
ADLS_ACUITY_SCORE: 20
ADLS_ACUITY_SCORE: 22
ADLS_ACUITY_SCORE: 20

## 2022-05-27 NOTE — PROGRESS NOTES
General surgery note    Discussed operative findings, tolerating clears fine.  Ambulating and doing I-S.  Follow protocol, home later this afternoon.

## 2022-05-27 NOTE — PROGRESS NOTES
VSS. A/O. Ind. abdo incisions CDI. Passing gas. Tolerating diet. Oxy given. Voiding fine. d'c home/meds and papers givens.

## 2022-05-27 NOTE — DISCHARGE SUMMARY
[facility] Discharge Summary    Bethel Woods MRN# [id number]   Age: 47 year old YOB: 1975     Date of Admission:  5/26/2022  Date of Discharge:  5/27/2022  Admitting Provider:  Randy Mesa MD  Discharge Provider:  Brandon Whitehead PA-C with Dr. Randy Mesa  Discharging Service: Bariatric Surgery     Primary Provider: Rae Rios  Primary Care Physician Phone Number: [phone]          Admission Diagnoses:   Principle Diagnosis: Morbid obesity (H) [E66.01]  Morbid obesity with BMI of 50.0-59.9, adult (H) [E66.01, Z68.43]  Secondary Diagnoses associated with Obesity:  HTN, Mild Asthma, Prediabetes  Other Diagnoses: Atrial fibrillation s/p ablation, Anxiety           Discharge Diagnosis:     Morbid obesity (H) [E66.01]          Procedures:     Procedure(s): Very Long Limb Laparoscopic Gastric Bypass with Donnie-en-Y Gastrojejunostomy            Discharge Medications:     Current Discharge Medication List      START taking these medications    Details   metoprolol tartrate (LOPRESSOR) 50 MG tablet Take 1 tablet (50 mg) by mouth 2 times daily  Qty: 60 tablet, Refills: 1    Associated Diagnoses: Morbid obesity with BMI of 50.0-59.9, adult (H); Bariatric surgery status      omeprazole (PRILOSEC) 20 MG DR capsule Take 1 capsule (20 mg) by mouth every morning (before breakfast)  Qty: 90 capsule, Refills: 0    Associated Diagnoses: Morbid obesity with BMI of 50.0-59.9, adult (H); Bariatric surgery status      ondansetron (ZOFRAN ODT) 4 MG ODT tab Take 1 tablet (4 mg) by mouth every 6 hours as needed for nausea or vomiting  Qty: 10 tablet, Refills: 0    Associated Diagnoses: Morbid obesity with BMI of 50.0-59.9, adult (H); Bariatric surgery status      oxyCODONE (ROXICODONE) 5 MG tablet Take 1 tablet (5 mg) by mouth every 4 hours as needed for moderate to severe pain  Qty: 15 tablet, Refills: 0    Associated Diagnoses: Morbid obesity with BMI of 50.0-59.9, adult (H); Bariatric surgery  status         CONTINUE these medications which have NOT CHANGED    Details   albuterol (PROAIR HFA/PROVENTIL HFA/VENTOLIN HFA) 108 (90 Base) MCG/ACT inhaler Inhale 2 puffs into the lungs every 4 hours as needed for shortness of breath / dyspnea or wheezing      ALPRAZolam (XANAX) 0.5 MG tablet Take 0.5 mg by mouth daily as needed for anxiety      apixaban ANTICOAGULANT (ELIQUIS) 5 MG tablet Take 5 mg by mouth 2 times daily      calcium carbonate (OS-SALLY) 1500 (600 Ca) MG tablet Take 600 mg by mouth 2 times daily (with meals)      ferrous sulfate (FEROSUL) 325 (65 Fe) MG tablet Take 325 mg by mouth daily (with breakfast)      flecainide (TAMBOCOR) 50 MG tablet Take 100 mg by mouth 2 times daily      fluticasone-salmeterol (ADVAIR-HFA) 230-21 MCG/ACT inhaler Inhale 2 puffs into the lungs 2 times daily      PARoxetine (PAXIL) 40 MG tablet Take 1 tablet by mouth daily       Pediatric Multiple Vitamins (CHILDRENS MULTIVITAMINS PO) Take 1 chew tab by mouth daily      tiZANidine (ZANAFLEX) 2 MG capsule Take 2 mg by mouth as needed (for muscle spasms)      vitamin C (ASCORBIC ACID) 500 MG tablet Take 1,000 mg by mouth daily       Vitamin D3 (CHOLECALCIFEROL) 125 MCG (5000 UT) tablet Take 125 mcg by mouth daily         STOP taking these medications       lisinopril-hydrochlorothiazide (ZESTORETIC) 20-25 MG tablet Comments:   Reason for Stopping:         metoprolol succinate ER (TOPROL XL) 100 MG 24 hr tablet Comments:   Reason for Stopping:                   Allergies:         Allergies   Allergen Reactions     Phentermine      Elevated Blood pressure and panic attacks     Sertraline      Felt like I was going to pass out.             Brief History of Illness:   Bethel Woods is a 47 year old-year-old male who underwent preoperative screening in anticipation for potential bariatric surgery. He was deemed an appropriate candidate for bariatric surgery by the consensus of our [facility] team. In the office, surgical  "options were thoroughly discussed. He was furnished with a copy of our specialized consent form for bariatric surgery. The potential risks as outlined in that document were all thoroughly reviewed. All questions were answered and he wished to proceed.    On the day of surgery, after reviewing the risks, benefits, and possible complications, informed consent was obtained and the patient underwent the above procedure.  There were no complications.  Please see the Operative Report for full details.           Hospital Course:   Bethel Woods's hospital course was unremarkable.  He recovered as anticipated and experienced no post-operative complications.  He was very lethargic initially, but likely contributed to his working nights and sleep pattern was off.  POD1, he looked much better and was comfortable.  Nausea was controlled and he was tolerating clear liquids well.    On the date of discharge, the patient was discharged to home in stable condition and afebrile.  He verbalized understanding of all discharge instructions and felt comfortable with the discharge plan.  He was asked to call with any further questions or concerns.           Condition on Discharge:        Discharge condition: Stable   Discharge vitals: Blood pressure 126/75, pulse 73, temperature 98.4  F (36.9  C), temperature source Oral, resp. rate 16, height 1.727 m (5' 8\"), weight (!) 161.3 kg (355 lb 8 oz), SpO2 97 %.           Discharge Disposition:     Discharged to home          Discharge Instructions and Follow-Up:      Bethel Woods was asked to follow up with the bariatric surgical team within 1 week.  He will see our clinic PA-C at that visit, with plans to reconvene with a Registered Dietician at the 2nd week office visit.    Brandon Whitehead PA-C  dictating on behalf of Dr. Randy Mesa  [phone]       "

## 2022-05-27 NOTE — PROGRESS NOTES
[facility] Comprehensive Weight Management  Post Bariatric Clinic  1 Week Surgical Follow-Up     PCP:  Rae Rios    DOS: 05/26/22  Surgeon: MOI  Surgery Type: Bypass    HISTORY OF PRESENT ILLNESS:  Bethel Woods returns today for his follow-up appointment status post bariatric surgery.  Bethel Woods's hospital course was unremarkable.  He recovered as anticipated and experienced no post-operative complications. On the date of discharge, the patient was discharged to home in stable condition and afebrile.  He is currently using Tylenol for pain medication.  Refill Needed: No.  Patient's Pain Scale: 3. Is is a burning pain under umbilicus.   Patient's current daily fluid intake in oz is: 80+ water, protein shake, powerade.  Patient has started postoperative Vitamins: No.  Activity:   Activity: up and moving around every hour, walking around stores daily  REVIEW OF SYSTEMS:  GI:    Nausea: No  Vomiting: No  Diarrhea: No  Constipation: No  Last BM: todayl; brown, loose  Dysphagia: No  GERD: No      CV/Pulmonary:   Chest Pain: No  Dizzy: No  Light Headed: No  SOB: No    Endo:  Diabetes: No    :    Contraception: male  Dysuria: No    Vascular:    LE Edema: No   PHYSICAL EXAMINATION:    /85 (BP Location: Right arm, Patient Position: Sitting)   Pulse 68   Temp 98.3  F (36.8  C) (Oral)   Resp 16   Wt (!) 351 lb 8 oz (159.4 kg)   SpO2 96%   BMI 53.45 kg/m    GENERAL: No acute distress. Alert and oriented time 3.  HEART: Regular rate and rhythm. No murmurs, rubs or gallops  LUNGS:  Breathing unlabored. Clear to auscultation bilaterally.  ABDOMEN: Soft, incisions clean,dry, and intact. Tenderness WNL for post op.  EXTREMITIES: No lower extremity edema bilaterally. No calf swelling or tenderness. Negative donny's sign.  SKIN: No rashes.  PSYCHOLOGICAL: Stable. Pleasant     Assessment & Plan   Problem List Items Addressed This Visit     Essential hypertension     Restart  Lisinopril/hydrochlorothiazide 20/25 mg daily.   Continue your Metoprolol XL 50 mg daily.   Follow up on HTN with PCP on Friday           Paroxysmal atrial fibrillation (H)     Continue 5 mg apixaben daily.            Bariatric surgery status - Primary     5/26/2022 Laparoscopic Gastric Bypass LEL           Malnutrition following gastrointestinal surgery     Continue taking recommended post-op vitamins.  Labs to be ordered per protocol at 3 mo po.              Post-op pain     Pt has post op nerve pain at umbilical incision that worsens with lifting left arm. If this persists he will call and we can try gabapentin 600 mg BID x 1 month.                 Current Outpatient Medications   Medication     albuterol (PROAIR HFA/PROVENTIL HFA/VENTOLIN HFA) 108 (90 Base) MCG/ACT inhaler     ALPRAZolam (XANAX) 0.5 MG tablet     apixaban ANTICOAGULANT (ELIQUIS) 5 MG tablet     flecainide (TAMBOCOR) 50 MG tablet     fluticasone-salmeterol (ADVAIR-HFA) 230-21 MCG/ACT inhaler     metoprolol succinate ER (TOPROL XL) 50 MG 24 hr tablet     omeprazole (PRILOSEC) 20 MG DR capsule     PARoxetine (PAXIL) 40 MG tablet     calcium carbonate (OS-SALLY) 1500 (600 Ca) MG tablet     ferrous sulfate (FEROSUL) 325 (65 Fe) MG tablet     ondansetron (ZOFRAN ODT) 4 MG ODT tab     Pediatric Multiple Vitamins (CHILDRENS MULTIVITAMINS PO)     tiZANidine (ZANAFLEX) 2 MG capsule     vitamin C (ASCORBIC ACID) 500 MG tablet     Vitamin D3 (CHOLECALCIFEROL) 125 MCG (5000 UT) tablet     No current facility-administered medications for this visit.     PLAN  Patient Instructions:  Restart Lisinopril/hydrochlorothiazide 20/25 mg daily.   Continue your Metoprolol XL 50 mg daily.   Continue your apixaben 5 mg daily.   Return to clinic: in 1 week for diet/nurse visit.  Bring post op vitamins along.  Call with questions or concerns at any time. [phone]  GENERAL POST OP GUIDELINES  Hydration:  Strive to drink 64 oz of fluid daily  Diet:   Advance diet per  Dietitian at your 2 week appointment. Start recommended postoperative vitamins within in the first 6 weeks.  Movement:  Strive to move hourly while awake to decrease risk of developing a blood clot. Continue to do deep breathing exercises twice daily or use your spirometer.  Pain:  Can use tylenol 1000 mg scheduled three times a day for pain. If you have Narcotic pain medication take only as needed. Start to decrease use and stop by end of week 2.  Ice packs and heat are helpful for abdominal pain and muscle strains. Wear binder to support abdominal muscles and gradually wean off over the next few weeks.   Meds: Continue with recommended antacid medication for the next 3 months.   FOLLOW UP:  Return to clinic for 2 wk post op appointment and bring post op vitamins along.  Make follow up appointment to meet with psychologist at 1 and 3 months post operatively. Follow up with your primary care provider to discuss obesity related conditions by one month post op.   Call [phone] or [employer]t with questions or concerns at any time.

## 2022-05-27 NOTE — PLAN OF CARE
Goal Outcome Evaluation:      Pt AO x4. VSS on RA. Pt denies n/v, denies dizziness. Pt tolerating small sips of water. Pt has abdominal discomfort around incision sites, abdominal binder on, pain managed with PRN tylenol and oxycodone. Pt ambulated room x1 and garcia x1 this shift, SBA. Voiding spontaneously. Incision dressings CDI. Sleeping between cares. Plan for possible discharge home pending adequate fluid intake and pain management. Continue to monitor.

## 2022-05-27 NOTE — CONSULTS
"NUTRITION EDUCATION    REASON FOR ASSESSMENT:  Bariatric Surgery Consult    CURRENT DIET:  Bariatric Clear Liquid    NUTRITION HISTORY:  Patient worked with clinical dietitian in [facility] Loss Clinic prior to surgery to assist with lifestyle modification.    ANTHROPOMETRICS:   Ht: 68\" inches  Wt: 161.3 kg  BMI: 54.05 kg/(m^2)  IBW: 70 kg  %IBW: 230%    ASSESSED NUTRITION NEEDS:  Energy Needs: 9607-9651 kcal  (11 - 14 kcal/kg ABW)                      Protein Needs: 70-84 g (1 - 1.5 gm/kg IBW)  Fluid Needs: 7621-6474 mL  (1mL/kcal/ABW)    Know patient will not meet assessed needs due to the restrictive nature of surgery.    NUTRITION DIAGNOSIS:   Food- and nutrition related knowledge deficit related to bariatric surgery as evidence by gastric bypass surgery on 5/26/22.    INTERVENTIONS:    Nutrition Prescription:    Recommended patient follow bariatric diet advancement for gastric bypass surgery    Implementation:    Nutrition Education (Content):  a) Reviewed Stage 2 Diet, Low-Fat Full Liquid diet guidelines   b) Provided handouts:  Your Stage 2 Diet, Low-Fat Full Liquids and Supplements After Weight Loss Surgery    Nutrition Education (Application):  c) Discussed current eating habits and recommended alternative food choices  d) Patient verbalizes understanding of diet by listing appropriate foods for home    Anticipate good compliance    Diet Education - refer to Education Flowsheet    Goals:    Patient will follow bariatric diet advancement schedule    Patient will follow post-operative vitamin mineral schedule            Please bring vitamin/ mineral bottle to 2 week post-op visit                   Follow Up:    Patient to follow up in 2 weeks with RD in Weight Loss Clinic Please bring vitamin/ mineral bottle to 2 week post-op visit    Provided RD contact information for future questions    Luisito Hair RD, BERTA  [facility], [place]    "

## 2022-05-27 NOTE — DISCHARGE INSTRUCTIONS
After Bariatric Surgery Discharge Instructions  [facility]     Note: Ask your nurse to order your medications from the pharmacy. Be sure you have your medications with you when you leave.    What should my diet be for the first 2 weeks after surgery?  Follow the bariatric diet the dietitian discussed with you.    How much fluid should I drink?  Strive for 48-64 ounces daily.  Carry a water bottle with you without a straw or sports top. Drink from it often.  Keep track of how much fluid you drink in a day.  Remember:  -Do not use straws, chew gum or suck on hard candies. They may cause painful gas.    -Sip, don't slurp when you drink.    -Practice small sips using a medicine cup for the first week postop.   -No ice or cold drinks. This could cause gas or spasms.   -No coffee, soda pop or drinks with caffeine. These may cause stomach pain.   -No alcohol. It is bad for your liver and will cause stomach pain.    How often should I do my deep breathing and coughing?  Use your incentive spirometer (small plastic breathing device) every hour while awake after you get home. Using the incentive spirometer helps you deep breath. Continuing to cough and deep breath will help prevent fevers and pneumonia.   If you do not have a fever after one week, you can stop using the incentive spirometer and discard it.     You can continue to take deep breaths without the incentive spirometer every one to two hours while awake for the month after surgery.    What kinds of activity can I do?  Get plenty of rest the first few days after surgery and try to balance rest and activity. You will need some time to recover - you may be more tired than you realize at first. You'll feel better and heal faster if you take good care of yourself.  For 4 weeks after surgery (Please review restrictions at your one week visit, they could change based on how well you are doing):  -Don't lift more than 20 pounds.    -Take 4-5 short walks every day.  -Don't jog, run, or do belly exercises.  Don't swim, bathe or use a hot tub until your cuts are healed (scabs are gone).  You may shower  Don't plan to fly or take a road trip within the first 1 to 3 months after surgery.  You could get a blood clot in your legs. If you must travel, get up and move around every hour for at least 5 minutes before continuing on your journey.  Your care team can help you decide when it's safe for you to travel.     What can I do for pain control?  You had major belly surgery that involves all layers of your belly muscles. Pain is expected, even for some as far out as 6-8 weeks after surgery. Moving, sneezing, coughing, and breathing will cause discomfort because these activities use your belly muscles.   Please see your after visit summary medication review for what pain medication will be continued, discontinued and newly started for you.    You can take opioid pain medicine if prescribed and if needed. Try to wean off from it as soon as you feel comfortable.   Do not drive while you are taking opioid pain medicine. This is dangerous.  You can take acetaminophen (Tylenol) between your prescribed pain medicine on a scheduled basis OR take it scheduled every 6 hours (check with your care team for specifics).  -Acetaminophen formulation options:    -Liquid   -Caplet (Cut caplet in half before taking)   -Do not take more than 3000 mg Tylenol in a 24 hour period.  -You may also take Tylenol for pain in place of the opioid pain medicine (check with your care team for specifics).   You can apply ice or heat to the affected area(s). Just remember to wrap the ice in something and limit icing sessions to 20 minutes. Excessive icing can irritate the skin or cause skin damage.  You can apply heat with a hot, wet towel or heating pad. Just like cold therapy, limit heat application to 20 minutes. Never sleep with a heating pad on. It could cause severe burns to  your skin.  Wear your binder to support your belly muscles if you have one.  Take this off a little more each day and try to be off completely by 2 weeks after surgery. If you don't need your binder for comfort or support, you don't need to wear it.   You may not be able to sleep in a comfortable position for a few weeks after surgery. This is normal. You may be more comfortable sleeping in a recliner or propped up with 3 pillows for the first couple of weeks after surgery.  Do not take NSAID's (Non-Steroidal Anti-Inflammatory Drugs) (examples: ibuprofen, Motrin, Advil, Aleve, and Naproxen), aspirin, or use pain patches with NSAID's. They will increase your risk of bleeding or getting an ulcer.    Please call the clinic for any of the following pain concerns, we would like to talk to you:  -pain that does not improve with rest  -pain that gets worse and worse  -pain that is not controlled by your pain medicine  -a sudden severe increase in pain    What medications will I need to take after surgery?  You may be discharged with the following types of medications: antacids, pain medications, anti-nausea medications, etc.  Please see your after visit summary medication review for what will be continued, discontinued and newly started.  It is important to reduce the amount of acid in your new stomach for a couple of months after surgery while it is still healing. We will prescribe an acid reducer or antacid. Take it as directed. This will help prevent ulcers, heartburn and acid reflux.  If you took an acid reducer before you had surgery, your care team will let you know what acid reducer you will take after surgery.   It is okay to swallow any medications smaller than   inch in size.   -If it is larger than   inch, it may need to be cut, crushed, or in a liquid form. Check with your care team about which way is most appropriate for you and your medications.    What should I know about my incisions (cuts)?  Your incisions  are covered with white steri strips or butterfly tape and have band aids or gauze over the top. If you have gauze or band aids, they can come off in the hospital.  Leave your steri strips on until they fall off on their own. If the steri strips don't fall off after 1 to 2 weeks, you can take them off. If they fall off earlier, replace them with clean band aids trying to avoid touching the incision itself.   If you have gauze covered by a clear dressing, remove 2-3 days after surgery or as directed by your care team.  You may shower in the hospital after surgery and can get your incision coverings wet.  Do not submerge in water (e.g. No baths, swimming pools, hot tubs) until your care team tells you it is okay and your incisions are completely healed.    Call the clinic if you have any of these signs or symptoms of infection:  -Redness around the site.  -Drainage that smells bad.  -Drainage that is thick yellow or green.  -An increase in pain around the incision site  -An increase in swelling around the incision site  -Heat or warmth around incision site   -Fever of 101.5  F (38.3  C) or higher when taken under the tongue.    -Chills    Will my urine or bowel movements change?   Your first bowel movements (stools) will likely be liquid. You may also notice old blood or a darker color (black or maroon color) in your bowel movements.  This is not unusual and usually goes away after the first week, if not sooner. You may not have a bowel movement for a week.   If you have not had a bowel movement for at least three days after your surgery date and are passing gas, you can use over the counter stool softeners.  Please stop taking the stool softeners and laxatives if your stools are loose.  Increasing fluids and activity as well as getting off narcotics will help prevent constipation.    Call the clinic if:   -You have stomach pain.   -You continue to have constipation.  -You have excessive bloating after walking and  "passing gas.    How can I prevent dehydration if I feel nauseated (sick to my stomach) and vomit (throw up)?   Vomiting is not normal after surgery. If you continue to have nausea and vomiting, call the clinic.   Nausea can be a sign of dehydration. That is why it is very important to track your fluids.  Do not nap more than one hour during the day. Set a timer to wake yourself up, if needed. Too much sleep will keep you from drinking enough fluid during the day and lead to dehydration.  No outside activity in hot, humid weather until you can drink 48 to 64 ounces of fluid in 24 hours. If you sweat a lot, your body may lose too much water.  Try to take a 1 ounce sip of water (one medicine cup) every 15 minutes.  Set a timer to remind yourself.    Call the clinic if you have any of these signs or symptoms of dehydration:  -Dark colored urine  -Urinating (pass water) less than 2-3 times per day  -Lack of energy  -Nausea  -Dizziness  -Headache    Call the clinic ANY TIME at [phone] if:  -Your pain medicine is not working.  -You have a fever ? 101.5 F.  -You have belly or left shoulder pain that gets worse and worse.  -You have a swollen leg with redness, warmth, or pain behind the knee or calf.  -You have chest pain   -You feel very short of breath.  -You have a sudden severe increase in heart rate.  -You have vomiting that gets worse and worse.  -You have constant nausea (feeling sick to your stomach) that does not go away with medication.  -You have trouble swallowing.  -You have an increasing feeling that \"something is not right\".  -You have hiccups that do not stop.  -You have any questions or concerns.    If you have to go to the Emergency Room, we prefer you go to the hospital that did your surgery. Please let them know that you had bariatric surgery and to notify your surgeon.    When should I go back to the clinic?  Follow up with your care team in 1-2 weeks.   If this appointment was not already made, " please call: [phone]

## 2022-05-28 NOTE — PROGRESS NOTES
Received call from Bethel today asking about a bag of medications he may have left behind when he discharged. Writer checked both the room and the unit's discharge medication bin, both of which were empty. The writer did find the signed receipt for the medications filled by discharge pharmacy, however could not find the bag. Writer also checked with main pharmacy but they said they received no discharge medications.     Writer called back to Bethel and recommended he double check the car he was driven home in, as well as possibly calling the surgery clinic for new scripts if the medications were not found.

## 2022-05-31 ENCOUNTER — TELEPHONE (OUTPATIENT)
Dept: SURGERY | Facility: CLINIC | Age: 47
End: 2022-05-31
Payer: COMMERCIAL

## 2022-05-31 NOTE — TELEPHONE ENCOUNTER
Called pt to update him on message per Elaina OLIVARES below. Pt reports he called his PCP office and they got him in for an appt Monday 6/6. He said he spoke with the nurse there but they were not able to get any answers on what medications he should take/restart until he sees PCP next Monday.    Pt checked his BP while on the telephone:  Pulse 70  /89    Will forward to MARCELINO to see what she would like to do re: medications.    Tami Fountain RN on 5/31/2022 at 1:52 PM      When he calls his PCP I would have then discuss what they would like him to do with his blood pressure medication.  I would have him leave his incisions alone unless they are getting irritated.  Then he can cover.     MARCELINO

## 2022-05-31 NOTE — TELEPHONE ENCOUNTER
Per CASSANDRA Delaney PA-C.  Pt to restart his Metoprolol ER for now since insurance won't pay for the change and his PCP is unable to get back to him until Monday next week.  Patient also to resume Eliquis as this is on his discharge paperwork to restart.    Called patient and informed re: above.  Patient to contact clinic with further questions/concerns.    Patient verbalized understanding and is agreeable to plan.  Ivone Louis, MS, RD, RN

## 2022-05-31 NOTE — TELEPHONE ENCOUNTER
Called pt to check on postop status.  Pt responses below:    Surgery Date: 5/26/22  Surgery Type: Bypass  Surgeon: Moshe    Fluid Intake:  Was able to get in 32 oz powerade, 28 oz protein shakes + water here and there yesterday    14 oz protein shake, 10 oz water, 15 oz powerade so far today    Pain Assessment:    Pain level: 3/10  Location: incisions  Tylenol: 2 pills xtra strength TID  Oxycodone: only needing it to go to sleep when he is pain    Medications:  RX for PPI?  Was prescribed but pt did not start. He thought it was PRN. Advised he start and take every day for 3 months PO.  Do you understand how to take your medications postop?  yes  Reviewed postop med changes:  reviewed    START taking these medications     Details   metoprolol tartrate (LOPRESSOR) 50 MG tablet Take 1 tablet (50 mg) by mouth 2 times daily  Qty: 60 tablet, Refills: 1     Associated Diagnoses: Morbid obesity with BMI of 50.0-59.9, adult (H); Bariatric surgery status       omeprazole (PRILOSEC) 20 MG DR capsule Take 1 capsule (20 mg) by mouth every morning (before breakfast)  Qty: 90 capsule, Refills: 0     Associated Diagnoses: Morbid obesity with BMI of 50.0-59.9, adult (H); Bariatric surgery status       ondansetron (ZOFRAN ODT) 4 MG ODT tab Take 1 tablet (4 mg) by mouth every 6 hours as needed for nausea or vomiting  Qty: 10 tablet, Refills: 0     Associated Diagnoses: Morbid obesity with BMI of 50.0-59.9, adult (H); Bariatric surgery status       oxyCODONE (ROXICODONE) 5 MG tablet Take 1 tablet (5 mg) by mouth every 4 hours as needed for moderate to severe pain  Qty: 15 tablet, Refills: 0     Associated Diagnoses: Morbid obesity with BMI of 50.0-59.9, adult (H); Bariatric surgery status         Pt says he was not given RX for Metoprolol Tartrate   Called [facility] - spoke with Gypsy who said Metoprolol tartrate was not filled because insurance said it was last filled on 4/29 at [facility]  Called [facility] pharmacy in [place]. Pharmacy  staff informed me that pt last picked up an rx of Metoprolol succinate #90 day supply on 4/29. They do not have RX for Metoprolol Tartrate.      STOP taking these medications         lisinopril-hydrochlorothiazide (ZESTORETIC) 20-25 MG tablet Comments:   Reason for Stopping:            metoprolol succinate ER (TOPROL XL) 100 MG 24 hr tablet      Pt reports he did take lisinopril-hydrochlorothiazide 1/2 pill once since being home (bp was high one day 148/108)   Has not taken metoprolol succinate ER   Pt says he was told that he was going to restart just a lisinopril (no hydrochlorothiazide) at discharge by the surgeon but there is no rx.   Most recent blood pressure was 142/79    Have you restarted all of the medications that weren't changed after surgery?  no    CONTINUE these medications which have NOT CHANGED     Details   albuterol (PROAIR HFA/PROVENTIL HFA/VENTOLIN HFA) 108 (90 Base) MCG/ACT inhaler Inhale 2 puffs into the lungs every 4 hours as needed for shortness of breath / dyspnea or wheezing       ALPRAZolam (XANAX) 0.5 MG tablet Take 0.5 mg by mouth daily as needed for anxiety       apixaban ANTICOAGULANT (ELIQUIS) 5 MG tablet Take 5 mg by mouth 2 times daily   Has not restarted eliquis - pt says he was advised by PCP to start 3 days prior to surgery but was not told when to restart. Advised pt call PCP asap re: restarting eliquis. Informed pt we advise pt's restart all their home medications unless told otherwise.   calcium carbonate (OS-SALLY) 1500 (600 Ca) MG tablet Take 600 mg by mouth 2 times daily (with meals)       ferrous sulfate (FEROSUL) 325 (65 Fe) MG tablet Take 325 mg by mouth daily (with breakfast)       flecainide (TAMBOCOR) 50 MG tablet Take 100 mg by mouth 2 times daily       fluticasone-salmeterol (ADVAIR-HFA) 230-21 MCG/ACT inhaler Inhale 2 puffs into the lungs 2 times daily       PARoxetine (PAXIL) 40 MG tablet Take 1 tablet by mouth daily        Pediatric Multiple Vitamins (CHILDRENS  MULTIVITAMINS PO) Take 1 chew tab by mouth daily       tiZANidine (ZANAFLEX) 2 MG capsule Take 2 mg by mouth as needed (for muscle spasms)       vitamin C (ASCORBIC ACID) 500 MG tablet Take 1,000 mg by mouth daily        Vitamin D3 (CHOLECALCIFEROL) 125 MCG (5000 UT) tablet       Has not started vitamins or PRN meds.    Pt to call PCP asap to discuss eliquis, metoprolol and lisinopril. Pt agreeable. He is going to call out clinic if he is unable to get answers today from his PCP.    Do you have any questions about how to take your medications?  no    Diet: Full liquid  How tolerated? well    Nausea: No    Vomiting: No    NSAIDs: no  Smoking: no    Fever: no    Incisions:   Pt said he took off the steri-strips off yesterday because they were causing discomfort and pulling his skin. He said they looked red. Once he took the steri-strips off he took a shower and noticed the redness when away. He denies any swelling or draining. He says the incisions look closed. He says he is wearing his abdominal binder.    BMs:  Last BM: today  Color/consistency: solid, brown color, no blood  Flatus: yes  Bloating/cramping:  no  Informed pt it is normal to have no stool for up to a week if passing gas and not having bloating/cramping.    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 He is using a recliner.     Activity: walking around every day frequently    Activity caution:  Advised pt to be careful to avoid straining abdominal muscles during recovery.    Incentive Spirometer: using and getting up to 3000ml    Other symptoms (CP/SOB/dizzy/rapid HR): no    Plan/Advice: 6/2 RN/PA-C   Advised pt to contact PCP and schedule hospital follow up visit within 1 week of discharge. Discuss medications or recommendations from discharging provider.  1 week followup appointment verified.   Use of incentive spirometer reinforced.  Call clinic  with any questions or concerns.  Reviewed that clinic staff are available on call during nights and weekends for postoperative concerns.  Number given.    No further questions or concerns now. Pt agrees to call if having any questions or concerns.  Tami Fountain RN on 5/31/2022 at 11:10 AM          Will forward to MARSHALL re: steri-strips and FYI on medications not restarted.

## 2022-06-02 ENCOUNTER — OFFICE VISIT (OUTPATIENT)
Dept: SURGERY | Facility: CLINIC | Age: 47
End: 2022-06-02

## 2022-06-02 ENCOUNTER — OFFICE VISIT (OUTPATIENT)
Dept: SURGERY | Facility: CLINIC | Age: 47
End: 2022-06-02
Payer: COMMERCIAL

## 2022-06-02 VITALS
HEART RATE: 68 BPM | SYSTOLIC BLOOD PRESSURE: 131 MMHG | RESPIRATION RATE: 16 BRPM | TEMPERATURE: 98.3 F | BODY MASS INDEX: 53.45 KG/M2 | WEIGHT: 315 LBS | OXYGEN SATURATION: 96 % | DIASTOLIC BLOOD PRESSURE: 85 MMHG

## 2022-06-02 DIAGNOSIS — K91.2 MALNUTRITION FOLLOWING GASTROINTESTINAL SURGERY: ICD-10-CM

## 2022-06-02 DIAGNOSIS — G89.18 POST-OP PAIN: ICD-10-CM

## 2022-06-02 DIAGNOSIS — I10 ESSENTIAL HYPERTENSION: ICD-10-CM

## 2022-06-02 DIAGNOSIS — Z98.84 BARIATRIC SURGERY STATUS: Primary | ICD-10-CM

## 2022-06-02 DIAGNOSIS — E66.01 MORBID OBESITY (H): ICD-10-CM

## 2022-06-02 DIAGNOSIS — I48.0 PAROXYSMAL ATRIAL FIBRILLATION (H): ICD-10-CM

## 2022-06-02 PROCEDURE — 99024 POSTOP FOLLOW-UP VISIT: CPT | Performed by: PHYSICIAN ASSISTANT

## 2022-06-02 PROCEDURE — 99207 PR NO CHARGE NURSE ONLY: CPT

## 2022-06-02 RX ORDER — METOPROLOL SUCCINATE 50 MG/1
50 TABLET, EXTENDED RELEASE ORAL DAILY
COMMUNITY
Start: 2022-06-02 | End: 2022-08-29

## 2022-06-02 ASSESSMENT — PAIN SCALES - GENERAL: PAINLEVEL: MILD PAIN (3)

## 2022-06-02 NOTE — PROGRESS NOTES
Patient seen by provider in clinic who completed assessment.    RTW letter for 6/13 written and to be signed by PAMaggieC for pt to  6/8 (2 wk PO visit).    Tami Fountain RN on 6/2/2022 at 4:11 PM

## 2022-06-02 NOTE — PATIENT INSTRUCTIONS
Today you were seen for your 1 week post op.    Patient Instructions:  Restart Lisinopril/hydrochlorothiazide 20/25 mg daily.   Continue your Metoprolol XL 50 mg daily.   Continue your apixaben 5 mg daily.     Return to clinic: in 1 week for diet/nurse visit.  Bring post op vitamins along.  Call with questions or concerns at any time. [phone]    GENERAL POST OP GUIDELINES    2 most important things to remember for the 1st month is to DRINK and MOVE.     Drink-   Keep a water bottle with you throughout the day (Have 20 oz down by lunch, supper, bedtime)  Sip on fluids every 15 minutes or more  Goal 50-60 oz of fluid daily  YOU DO NOT HAVE TO SEPARATE FLUIDS WITH YOUR LIQUID MEALS. (A liquid is a liquid)    Move-   Move in house every hour while awake to decrease risk of developing a blood clot.  Activity: Start 5 minutes of walking to start and increase when able    Continue to do deep breathing exercises twice daily or use your spirometer to avoid pneumonia.  Wear binder to support abdominal muscles if desired and gradually wean off over the next few weeks.       Medications:   Continue with recommended antacid medication for the next 3 months.   Postoperative vitamins within in the first 6 weeks.     Start recommended vitamins in the first 6 weeks.   Introduce them 1 at a time.   Diet:    Continue on liquid diet  You'll advance your diet per Dietitian at your 2 week appointment.     Pain Management:  You can take Acetaminophen (Tylenol) for pain. Max amount 3000 mg per day.  1000 mg three times as needed daily or 650 mg as needed 4 times daily     Follow up:    Return to clinic at 2 weeks, 4 weeks, 3 mo, 6 mo, and annually  Make follow up appointment to meet with psychologist at 1 and 3 months post operatively.   Follow up with your primary care provider to discuss obesity related conditions by one month post op.

## 2022-06-02 NOTE — ASSESSMENT & PLAN NOTE
Restart Lisinopril/hydrochlorothiazide 20/25 mg daily.   Continue your Metoprolol XL 50 mg daily.   Follow up on HTN with PCP on Friday

## 2022-06-02 NOTE — ASSESSMENT & PLAN NOTE
Pt has post op nerve pain at umbilical incision that worsens with lifting left arm. If this persists he will call and we can try gabapentin 600 mg BID x 1 month.

## 2022-06-02 NOTE — LETTER
[facility] [place] [id number] [facility] [address]  [facility] [address]  [phone]          June 2, 2022    RE:  Bethel Woods                                                                                                                                                       [address] [facility] [address]      Type of Request: Medical Necessity - employee s own  Serious Health Condition  requiring hospitalization and recovery from major surgery which prohibited patient from performing essential function(s) of his or her job in a safe manner. The surgery took place on May 26th, 2022. The patient can return to work June 13th - June 22nd with the following restrictions: no lifting, pushing, pulling or carrying > 20 lbs. Pt can return to work on June 23rd, 2022 with no restrictions..    Surgery took place on at: May 26th, 2022  Surgeon: Dr. Randy Mesa MD  [place] [id number] [place], [place] [address]      Sincerely,        MARSHALL Marcum RN

## 2022-06-06 ENCOUNTER — DOCUMENTATION ONLY (OUTPATIENT)
Dept: OTHER | Facility: CLINIC | Age: 47
End: 2022-06-06
Payer: COMMERCIAL

## 2022-06-06 NOTE — PROGRESS NOTES
"BARIATRIC PROGRESS NOTE - 2 Week Post Op  DATE OF VISIT: June 6, 2022    Bethel Woods  1975  male  [id number]  47 year old    ASSESSMENT:    REASON FOR VISIT:  Bethel Woods is a 47 year old year old male presents today for a 2 Week Post Op nutrition follow-up appointment. Patient is accompanied by self      DIAGNOSIS:  Status: post gastric bypass surgery.   Obesity Grade III BMI >40    ANTHROPOMETRICS:  Height: 68\"   Initial weight: 376.2 lb    Current Weight: 341.3 lb   BMI: 51.89  kg/(m^2).    VITAMINS AND MINERALS:   Brought bottle for these prodcuts-   2 Multivitamin with Minerals (Childrens chewable complete with 10 mg iron) -7 am     Has not started:  650 mg Viactiv Calcium carbonate chew with Vitamin D (500 international unit(s) ) BID at noon 9:00 pm  5000 International units Vitamin D-noon        NUTRITION HISTORY:  Tolerating diet: Bariatric full liquid diet  Fluids/water intake: 70 ounces/day  Small bites: Yes  Portion size: 1/2- 1 cup  20-30 minute meals: Yes  Lunch: 1/2 cup beef or chicken broth or strained cream of chicken soup or tomato  Dinner: 1 cup sugar free pudding or sugar free gelatin  Snacks: Protein drink with 25-40  grams protein  Nausea: no  Vomiting: no  Constipation: no  Additional Information: Yesterday tried 5 bites of taco meat. His stomach hurt and had loss stools. \"Patient said he learned his lesson.\"Discussed the importance of not prematurely advancing his diet. Has a puppy who is keeping patient busy. Patient would like to take the least number of vitamins/ mineral as possible (plans to try a Bariatric vitamins/ mineral when he runs out of his current product)      PHYSICAL ACTIVITY:  Type: walking  Frequency: 6 days a week.  Duration: 60 minutes.    DIAGNOSIS:     Current Nutrition Diagnosis: Altered gastrointestinal function related to alternation in gastrointestinal structure as evidenced by history of gastric bypass.     INTERVENTION  Nutrition Prescription: Recommended " bariatric puree diet.    Goals:  Start puree diet at day 14 post op.  Start 5000 mcg vitamin B-12 one time per week  Start 100 mg thiamine per week or switch to a bariatric product that has adequate thiamine and vitamin D (offered suggestions)  Start: calcium and vitamin D  Continue MVI  Aim for 60 to 90 grams protein.  Continue 48 to 64 oz of fluid per day.    Implementation:  Discussed transition to puree diet.  Emphasized importance of adequate protein.  Reviewed required vitamins and mineral supplements.  Verbalizes good understanding of surgery diet guidelines.  Assessed learning needs and learning preferences.      NUTRITION MONITORING AND EVALUATION:   Monitor diet tolerance and weight loss.      Anticipated Compliance: fair-good  Follow Up: Continue to monitor patient closely regarding weight loss and diet.  At 4 weeks Post Op    TIME SPENT WITH PATIENT: 26 minutes.  Luisito Hair RD, LD  [facility], [place]

## 2022-06-08 ENCOUNTER — OFFICE VISIT (OUTPATIENT)
Dept: SURGERY | Facility: CLINIC | Age: 47
End: 2022-06-08
Payer: COMMERCIAL

## 2022-06-08 VITALS
SYSTOLIC BLOOD PRESSURE: 130 MMHG | OXYGEN SATURATION: 98 % | BODY MASS INDEX: 51.89 KG/M2 | TEMPERATURE: 98.1 F | HEART RATE: 61 BPM | DIASTOLIC BLOOD PRESSURE: 84 MMHG | WEIGHT: 315 LBS | RESPIRATION RATE: 16 BRPM

## 2022-06-08 DIAGNOSIS — Z98.84 BARIATRIC SURGERY STATUS: Primary | ICD-10-CM

## 2022-06-08 DIAGNOSIS — E66.01 MORBID OBESITY (H): ICD-10-CM

## 2022-06-08 DIAGNOSIS — E66.01 MORBID OBESITY WITH BODY MASS INDEX OF 50.0-59.9 IN ADULT (H): ICD-10-CM

## 2022-06-08 PROCEDURE — 99207 PR NO CHARGE NURSE ONLY: CPT

## 2022-06-08 PROCEDURE — 97803 MED NUTRITION INDIV SUBSEQ: CPT | Performed by: DIETITIAN, REGISTERED

## 2022-06-08 RX ORDER — LISINOPRIL 20 MG/1
20 TABLET ORAL DAILY
COMMUNITY
End: 2022-08-29

## 2022-06-08 RX ORDER — METOPROLOL TARTRATE 50 MG
50 TABLET ORAL 2 TIMES DAILY
COMMUNITY

## 2022-06-08 ASSESSMENT — PAIN SCALES - GENERAL: PAINLEVEL: NO PAIN (1)

## 2022-06-08 NOTE — PROGRESS NOTES
[facility]  2 Week Surgical Follow-Up     HISTORY OF PRESENT ILLNESS:  The patient returns today for two week follow-up appointment status post gastric bypass  The patient is accompanied by self.   The patient is drinking 70+ oz of fluid daily, including water, powerade, protein shakes.    Pain:    The patient is currently using Tylenol for pain medication.  The patient rates pain at a 1/10  on the pain scale. The pain is located in above belly button, burning with activity. Pt reports it is tolerable, will contact clinic it worsens or does not improve.      Activity:  The patient is considered a fall risk:  No. Interventions to secure the patient's safety are in place.  What are you doing for physical activity?  walking  How many days per week?  Almost everyday  How many minutes per day?  1 hour  Review of Systems:  GI:    Nausea occurs never.           Vomiting occurs never.     GERD occurs never.  Patient is currently taking Omeprazole 20 mg daily.           Diarrhea occurs never.  Patient's last bowel movement was last night, with the color noted as brown.  Patient is taking NA to control loose bowel movements.          Constipation occurs never.           CV/Pulmonary:   Dizziness occurs never.     Shortness of Breath occurs never.  Chest pain occurs never.    Vascular:    Leg swelling none.     Thomas's Negative     :  Form of birth control is other male.    PHYSICAL EXAMINATION:    VITALS:  See Epic for VS.  LUNGS:  clear to auscultation  HEART:  Apical pulse strong, regular.  ABDOMEN:  Abdomen soft, non-tender. BS normal.   INCISIONS:  dry and intact.  Steristrips removed.  Adhesive remover given to patient.    MEDICATIONS/ALLERGIES REVIEWED:  Yes    ASSESSMENT:    1.  2 weeks status post gastric bypass surgery.    PLAN:    Increase activity per physical therapist recommendations today.   Make follow up appointment to meet with psychologist and 1 month post operatively.   Follow up with  your primary care provider to obesity related conditions by one month post op.   Advance diet per Dietitian at your 2 week appointment.   Start recommended postoperative vitamins within in the first 6 weeks per Dietitian  Strive to drink 64 oz of fluid daily.  Wear binder to support abdominal muscles and gradually wean off over the next few weeks.   Continue to do deep breathing exercises twice daily or use your spirometer.   Return to clinic in 4 weeks for nutrition class.  Return to clinic postop month 3.  Call with questions or concerns at any time.    INTERVENTIONS:      Symptoms given re: signs and symptoms of infection and when to call clinic. Patient verbalized understanding.    Patient exercise/activity restrictions reviewed; exercise handout given.  Exercise plan postop is gym membership.  Reviewed when patient can return to bathing and swimming.  Reviewed patient vitamin timing.  Instructed patient to take calcium carbonate in divided doses with meals, to take calcium separate from multivitamin, and to take both multivitamins together.  Reviewed signs/symptoms of DVT with patient.  Patient return to work date is 6/13. Signed letter given to pt.  Reviewed FMLA.  No further forms needed at this time.    No further needs or questions at this time.  Patient agrees to call clinic with any questions or concerns prior to next appointment.    Tami Fountain RN on 6/8/2022 at 9:24 AM

## 2022-06-28 ENCOUNTER — VIRTUAL VISIT (OUTPATIENT)
Dept: SURGERY | Facility: CLINIC | Age: 47
End: 2022-06-28
Payer: COMMERCIAL

## 2022-06-28 VITALS — WEIGHT: 315 LBS | BODY MASS INDEX: 49.72 KG/M2

## 2022-06-28 DIAGNOSIS — E66.01 MORBID OBESITY (H): ICD-10-CM

## 2022-06-28 PROCEDURE — 97803 MED NUTRITION INDIV SUBSEQ: CPT | Mod: GT | Performed by: DIETITIAN, REGISTERED

## 2022-06-28 NOTE — PATIENT INSTRUCTIONS
Gopi Hugo-  It was great to visit with you and learn about your progress. Below are the goals we discussed.    Goals:  Start soft diet at day 28 post op.  Restart 5000 mcg vitamin B-12 one time per week  Continue MVI, calcium with vitamin D  Aim for 60 to 90 grams protein.  Continue 48 to 64 oz of fluid per day  Select foods with < 10 grams of total fat per serving    Nutrition Educational Materials:  Food Label: The 10-10 Rule  [email or web address]   Your Stage 4 Diet: Soft Foods  [email or web address]       Please call [phone] to schedule your next visit/visits with a Dietitian/ Provider (PA or MD) at 3 months post-op-done  Thanks!  Luisito Hair RD, LD  [facility]

## 2022-08-26 NOTE — ASSESSMENT & PLAN NOTE
5/26/2022 Laparoscopic Gastric Bypass LEL    Can discontinue Omeprazole with finished with this month Rx.

## 2022-08-26 NOTE — PROGRESS NOTES
"NUTRITION POST OP APPOINTMENT  DATE OF VISIT: August 26, 2022    Bethel Woods  1975  male  [id number]  47 year old     ASSESSMENT:    REASON FOR VISIT:  Bethel is a 47 year old year old male presents today for 3 month PO nutrition follow-up appointment. Patient is accompanied by self.    DIAGNOSIS:  Status post gastric bypass surgery.  Obesity Grade III BMI >40     ANTHROPOMETRICS:  Initial Weight: 376.2 lb   Height: 68\"   Current Weight: 301 lb    BMI: 45.90  kg/(m^2).    VITAMINS AND MINERALS:  1 [employer] Bariatric Multivitamin/ mineral with 18 mg iron-adequate thiamine, vitamin D) -7 to 8 am    650 mg Viactiv Calcium carbonate chew with Vitamin D-  BID at noon and 9 pm  5000 mcg vitamin B-12, tablet-one time per week      NUTRITION HISTORY:  Breakfast: keto toast, no crust with butter, 30 minutes later protein drink with 25 grams protein  Lunch: 1 string cheese,  Watermelon, 30 minutes later protein drink with 25 grams protein  Supper: 1/3 cup ground beef or 1/2 cup baked potato soup  Snacks: sometimes 1 string cheese if hungry, 1/2 protein drink  Fluids consumed: 34 oz Water,+ 30 oz Powerade Zero,  2.5 bottles of protein drink (Muscle Milk, no sugar)  Consuming liquid calories: Yes  Protein intake: 70-80 grams/day  Tolerate regular texture food: Yes  Any foods not tolerated details: Yes  If any food not tolerated: hamburger, grilled  Portion size: 1/2-1 cup  Take 20-30 minutes to consume each meal: Yes   Eat protein foods first: Yes  Fluids and meals separate by at least 30 minutes: Yes  Tolerating diet: Yes  Drinking high protein supplements: Yes  Consuming snacks per day: 0-1  Additional Information: Biggest challenge has been dining out (having to stick with soups). Patient says some restaurants do not accept the dinning out card he got from the clinic. Acknowledged that dinning out after weight loss surgery can be very challenging.  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 Has " not tried any veggies yet (likes carrots, green beans, corn, potatoes). He is pleased with his weight loss, but reports hitting weight loss plateau. Suggested pt try to get back on track with regular exercise.     PHYSICAL ACTIVITY:  Type: not consist for the past 1 month      DIAGNOSIS:  Previous Nutrition Diagnosis: Altered gastrointestinal function related to alteration in gastrointestinal structure as evidenced by history of gastric bypass surgery.- no change    Previous goals:  Start soft diet at day 28 post op.- met  Restart 5000 mcg vitamin B-12 one time per week-met, but is not SL  Continue MVI, calcium with vitamin D-met  Aim for 60 to 90 grams protein-met  Continue 48 to 64 oz of fluid per day-met  Select foods with < 10 grams of total fat per serving-met    Current Nutrition Diagnosis: Altered gastrointestinal function related to alteration in gastrointestinal structure as evidenced by history of gastric bypass surgery.    INTERVENTION:   Nutrition Prescription: Eat 3 meals a day at regular intervals. Consume 60-90 grams of protein daily. Follow post-surgical vitamin and mineral protocol.  Assessed learning needs and learning preferences.    GOALS:  Restart exercise at the gym 2 times per week, working up to 60 minutes  Reduce protein drinks to 2 per day  Start to include 3 different food groups per meal (provided 1 week bariatric  sample menu)  When done with current bottle of vitamin B-12 switch to a sublingual, liquid or nasal spray      Implementation: Discussed progress toward previous goals; reinforced importance of following bariatric lifestyle changes.    NUTRITION MONITORING AND EVALUATION:  Anticipated compliance: fair-good  Verbalized fair-good understanding.    Follow up: Patient to follow up in 3 months.    TIME SPENT WITH PATIENT:  22 minutes  Luisito Hair RD, BERTA  [facility]

## 2022-08-26 NOTE — PROGRESS NOTES
BARIATRIC FOLLOW UP      8/29/2022    HISTORY OF PRESENT ILLNESS: Pt presents today for his follow-up appointment status post laparoscopic gastric bypass.     Assessment & Plan   Problem List Items Addressed This Visit     Morbid obesity (H)     Patient was congratulated on wt loss success thus far. Healthy habits to assist with further weight loss were discussed.             Bariatric surgery status - Primary     5/26/2022 Laparoscopic Gastric Bypass LEL    Can discontinue Omeprazole with finished with this month Rx.            Relevant Orders    Vitamin D Screen    Parathyroid Hormone Intact    Iron and Iron Binding Capacity    Ferritin    Vitamin B12    Malnutrition following gastrointestinal surgery     Continue taking recommended post-op vitamins.  Labs ordered per protocol.             Relevant Orders    Vitamin D Screen    Parathyroid Hormone Intact    Iron and Iron Binding Capacity    Ferritin    Vitamin B12           PATIENT INSTRUCTIONS:  Have labs drawn  Continue your bariatric vitamins   Return to getting back to working out 1 hour every other day.   Can stop omeprazole when finished with this RX.   Avoid NSAIDS to avoid ulcer formation and/or perforation.    FOLLOW-UP:  Return to clinic in 3 mo    23 minutes spent on the date of the encounter doing chart review, history and exam, result review, counseling, developing plan of care, documentation, and further activities as noted      INTERVAL HX:     WEIGHT METRICS:  Body mass index is 45.9 kg/m .   Current Weight: 301 lb 14.4 oz (136.9 kg)  Last Visits Weight: 237 lb (107.5 kg)  Initial Weight (lbs): 376.23 lbs  Cumulative weight loss (lbs): 74.33  Weight Loss Percentage: 19.76%    Wt Readings from Last 10 Encounters:   08/29/22 301 lb 14.4 oz (136.9 kg)   06/28/22 327 lb (148.3 kg)   06/08/22 (!) 341 lb 4.8 oz (154.8 kg)   06/02/22 (!) 351 lb 8 oz (159.4 kg)   05/26/22 (!) 355 lb 8 oz (161.3 kg)   04/06/22 (!) 365 lb (165.6 kg)   03/08/22 (!) 376 lb  "3.2 oz (170.6 kg)   22 (!) 363 lb (164.7 kg)   22 (!) 378 lb (171.5 kg)   21 (!) 379 lb (171.9 kg)        VITAMINS:  1 [employer] Bariatric Multivitamin/ mineral with 18 mg iron-adequate thiamine, vitamin D) -7 to 8 am   650 mg Viactiv Calcium carbonate chew with Vitamin D-  BID at noon and 9 pm  5000 mcg vitamin B-12, tablet-one time per week    EXERCISE:  Pt has not been consistent with exercise in the last month.      OBESITY RELATED CONDITIONS:  HTN- resolved  DEEPALI- present, mild, does not have or use CPAP.  Sleeping soundly.     EATING HABITS:  See diet visit from today    SOCIAL HISTORY:  Pt denies smoking.  Pt denies alcohol use.  Used NSAIDS 1 time.  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 Plans to get back to working out  1 hour every other day.     REVIEW OF SYSTEMS:     GI:  Nausea-  No  Vomiting-  Yes, only if eating the wrong thing  Diarrhea-  No  Constipation-  No  Dysphagia-  No  Abd. Pain-  No  Heartburn-  No     SKIN:  Intertriginous irritation- No    PSYCH:  Mood:  Stable    LABS/IMAGING/MEDICAL RECORDS REVIEW:   Hemoglobin A1C   Date Value Ref Range Status   2021 5.5 0.0 - 5.6 % Final     Comment:     Normal <5.7%   Prediabetes 5.7-6.4%    Diabetes 6.5% or higher     Note: Adopted from ADA consensus guidelines.     Vitamin D, Total (25-Hydroxy)   Date Value Ref Range Status   2021 28 20 - 75 ug/L Final     Hemoglobin   Date Value Ref Range Status   2022 12.4 (L) 13.3 - 17.7 g/dL Final       PHYSICAL EXAMINATION:  /78   Pulse 64   Ht 5' 8\" (1.727 m)   Wt 301 lb 14.4 oz (136.9 kg)   SpO2 97%   BMI 45.90 kg/m      GENERAL: Healthy, alert and no distress  EYES: Eyes grossly normal to inspection.  No discharge or erythema, or obvious scleral/conjunctival abnormalities.  RESP: No audible wheeze, cough, or visible cyanosis.  No visible retractions or increased work of breathing.    SKIN: Visible skin clear. No " significant rash, abnormal pigmentation or lesions.  NEURO: Cranial nerves grossly intact.  Mentation and speech appropriate for age.  PSYCH: Mentation appears normal, affect normal/bright, judgement and insight intact, normal speech and appearance well-groomed.    COUNSELING PROVIDED:  We reviewed the important post op bariatric recommendations:  eating 3 meals daily  eating protein first, getting >60gm protein daily  eating slowly, chewing food well  avoiding/limiting calorie containing beverages  avoiding fluids 30 minutes before, during, and after meals  limiting restaurant or cafeteria eating to twice a week or less  Pt reminded to avoid marginal ulcers she should avoid tobacco at all, alcohol in excess, caffeine, and NSAIDS (unless indicated for cardioprotection or othewise and opposed by a PPI).  Pt encouraged to establish and maintain a consistent physical activity routine, 6-8 hours of restorative sleep each night and optimal stress management.  Pt counseled on the importance of life long vitamin supplementation and life long follow up.

## 2022-08-29 ENCOUNTER — OFFICE VISIT (OUTPATIENT)
Dept: SURGERY | Facility: CLINIC | Age: 47
End: 2022-08-29

## 2022-08-29 VITALS
BODY MASS INDEX: 45.75 KG/M2 | DIASTOLIC BLOOD PRESSURE: 78 MMHG | HEIGHT: 68 IN | HEART RATE: 64 BPM | WEIGHT: 301.9 LBS | OXYGEN SATURATION: 97 % | SYSTOLIC BLOOD PRESSURE: 124 MMHG

## 2022-08-29 DIAGNOSIS — Z98.84 BARIATRIC SURGERY STATUS: Primary | ICD-10-CM

## 2022-08-29 DIAGNOSIS — K91.2 MALNUTRITION FOLLOWING GASTROINTESTINAL SURGERY: ICD-10-CM

## 2022-08-29 DIAGNOSIS — E66.01 MORBID OBESITY (H): ICD-10-CM

## 2022-08-29 PROCEDURE — 97803 MED NUTRITION INDIV SUBSEQ: CPT | Performed by: DIETITIAN, REGISTERED

## 2022-08-29 PROCEDURE — 99213 OFFICE O/P EST LOW 20 MIN: CPT | Performed by: PHYSICIAN ASSISTANT

## 2022-08-29 RX ORDER — LEVALBUTEROL TARTRATE 45 UG/1
AEROSOL, METERED ORAL
COMMUNITY
Start: 2022-06-16 | End: 2024-07-12

## 2022-08-29 RX ORDER — IBUPROFEN 600 MG/1
600 TABLET, FILM COATED ORAL
COMMUNITY
Start: 2021-08-02 | End: 2022-11-28

## 2022-08-29 NOTE — PATIENT INSTRUCTIONS
"Nice to talk with you today. Thank you for allowing me the privilege of caring for you. We hope we provided you with the excellent service you deserve.     To ensure the quality of our services you may receive a patient satisfaction survey from an independent monitoring company.  The greatest compliment you can give is \"Likely to Recommend\"    Below is our plan we discussed.-  MARSHALL Delaney      Labs have been ordered in the system.You can have these drawn at any [facility] lab.  Please call [phone] set up an appointment.  Your results will be posted on [employer] as soon as they're complete.  After all are resulted, I will review and comment to you.    Continue your bariatric vitamins   Return to getting back to working out 1 hour every other day.   Can stop omeprazole when finished with this RX.     FOLLOW-UP:  Please call [phone] to schedule your next visit.     Bariatric Post Op Guidelines  General:    To avoid marginal ulcers avoid all forms of tobacco, alcohol in excess, caffeine, and NSAIDS (unless indicated for cardioprotection or othewise and opposed by a PPI).    Exercise is key for continued weight loss and weight maintenance. Aim for 30-60 minutes of physical activity most days of the week. Include cardiovascular and strength training.    Continue lifelong vitamins supplementation and annual lab follow up.  All  patients should supplement with the following bariatric postoperative vitamins:  2 Complete multivitamins with minerals (at different times than calcium)  Vitamin D 5000 Int Units/125 mg daily   Calcium 600 mg twice daily or 500 mg three times daily   Vitamin B12: 500 mcg sl daily or 1000 mcg Inj monthly  B complex daily or Thiamine 100 mg weekly  1 Iron/Vit C. Daily for females who menstruate and/or as directed    The bariatric team should be aware and evaluate all adverse gastrointestinal symptoms which can be a sign of complication. Inability to tolerate textured solid food " (chicken, steak, fish) may need to be evaluated by endoscopy.    There is a 10% increase of Alcohol Use Disorder in patients with bariatric surgery.   Most often occurring around 2 years post op.  Please call the clinic if you feel alcohol is interfering in your daily life.  We can help.     Follow up annually lifelong. Obesity is a chronic disease.  Weight gain can be expected. The goal of follow-up visits is to ensure adequate vitamin and protein absorption, evaluate food intake behavior, review exercise/activity level, and assist with weight regain.    Nutritional:  Eat 3 meals per day  (No snacks between meals.)  Do not skip meals.  This can cause overeating at the next meal and will prevent adequate protein and nutritional intake.    Aim for 60-80 grams of protein per day.  Always eat your protein first. This assists with optimal nutrition and helps you stay full longer.    Eat your protein first, and then follow with fiber.    Add fiber by including fruits, vegetables, whole grains, and beans.     Portions should be about 1 cup per meal. Use measuring cups to be accurate.  Continue to use saucer/salad plates, infant/toddler silverware to keep portion sizes small and take small bites.    Eat [id number] to make each meal last 20-30 minutes. Always stop eating when satisfied.    Aim for 64 oz. of calorie-free fluids daily.    Avoid drinking 30 min before, during, and 30 min after meal    Avoid high sugar and high fat foods to prevent high calorie intake. This will reduce your rate of weight loss and can cause weight regain.   Check nutrition labels for less than 10 grams of sugar and less than 10 grams of fat per serving.

## 2022-08-29 NOTE — ASSESSMENT & PLAN NOTE
Patient was congratulated on wt loss success thus far. Healthy habits to assist with further weight loss were discussed.

## 2022-11-25 NOTE — PROGRESS NOTES
Bethel is a 47 year old who is being evaluated via a billable video visit.      If the video visit is dropped, the invitation should be resent by: Text to cell phone: [phone]  Will anyone else be joining your video visit? No      Video-Visit Details    Type of service:  Video Visit    Video Start Time: 8:00 AM    Video End Time:8:14 AM    Originating Location (pt. Location): Home    Distant Location (provider location):  [facility] [place]     Platform used for Video Visit: [facility] Bariatric Surgery Note    RE: Bethel Woods  MR#: [id number]  : 1975  VISIT DATE: 2022    Dear Rae Rios,    I had the pleasure of seeing your patient, Bethel Woods, in my post-bariatric surgery assessment clinic.    Assessment & Plan   Problem List Items Addressed This Visit     Morbid obesity (H)     Patient was congratulated on success thus far. Healthy habits to assist with further weight loss were discussed.            Bariatric surgery status - Primary     2022 Laparoscopic Gastric Bypass LEL         Malnutrition following gastrointestinal surgery     Continue taking recommended post-op vitamins.  Labs ordered per protocol [facility] and get remaining labs drawn at [facility] lab.            Relevant Orders    Vitamin B12    CBC with platelets    Vitamin D Screen    Parathyroid Hormone Intact    Iron and Iron Binding Capacity    Ferritin        PATIENT INSTRUCTIONS:    FOLLOW-UP:  Annual visit scheduled with diet and PA-C    20 minutes spent on the date of the encounter doing chart review, history and exam, result review, counseling, developing plan of care, documentation, and further activities as noted        CHIEF COMPLAINT: Post-bariatric surgery follow-up    HISTORY OF PRESENT ILLNESS:  Questions Regarding Prior Weight Loss Surgery Reviewed With Patient 2022   I had the following weight loss procedure: Donnie-en-y Gastric  Bypass   What year was your surgery? 2022   How has your weight changed since your last visit? I have lost weight   Are you currently taking any weight loss medications? No   Do you currently have any of the following: None of the above   Have you been to the Emergency room since your last visit with us? Yes   Were you in the hospital since your last visit with us? No   Do you have any concerns today? No     Bethel is doing well. Has no concerns today.  Has no hunger and no cravings. Is staying away from sugar.  Has noticed weight loss has slowed.  Was on day shift and needed to decrease exercise.  Is doing it still on the weekends. Had lapse in insurance so did not get 3 month PO blood work.  Had some labs done at primary with [facility] in [place], [place].  Will get results this week and send screenshot in [employer].  Will get outstanding vitamin labs drawn with [facility].  Ordered today.      Weight History:     11/21/2022   What is your highest lifetime weight? 390   What is your lowest weight since surgery? (In pounds) 270     Initial Weight (lbs): 376.23 lbs  Weight: 265 lb (120.2 kg) (pt reported)  Last Visits Weight: 301 lb 14.4 oz (136.9 kg)  Cumulative weight loss (lbs): 111.23  Weight Loss Percentage: 29.56%    Patient is taking the following bariatric postoperative vitamins:  1 [employer] Bariatric Multivitamin/ mineral with 18 mg iron-adequate thiamine, vitamin D) -7 to 8 am   650 mg Viactiv Calcium carbonate chew with Vitamin D-  BID at noon and 9 pm  5000 mcg vitamin B-12, tablet-one time per week      Questions Regarding Co-Morbidities and Health Concerns Reviewed With Patient 11/21/2022   Pre-diabetes: Gone away   Diabetes II: Never   High Blood Pressure: Gone Away   High cholesterol: Never   Heartburn/Reflux: Gone away   Sleep apnea: Gone away   PCOS: Never   Back pain: Stayed the same   Joint pain: Stayed the same   Lower leg swelling: Improved       Eating Habits 11/21/2022   How many meals do you eat  per day? 2   Do you snack between meals? Sometimes   How much food are you eating at each meal? 1/2 cup to 1 cup   Are you able to separate your meals and liquids by at least 30 minutes? Yes   Are you able to avoid liquid calories? Yes       Exercise Questions Reviewed With Patient 11/21/2022   How often do you exercise? 3 to 4 times per week   What is the duration of your exercise (in minutes)? 60+ Minutes   What types of exercise do you do? walking, gym membership   What keeps you from being more active?  I am as active as I can possbily be, Too tired   Working out of the weekends.     Social History:      11/21/2022   Are you smoking? No   Are you drinking alcohol? No       Medications:  Current Outpatient Medications   Medication     albuterol (PROAIR HFA/PROVENTIL HFA/VENTOLIN HFA) 108 (90 Base) MCG/ACT inhaler     ALPRAZolam (XANAX) 0.5 MG tablet     calcium carbonate (OS-SALLY) 1500 (600 Ca) MG tablet     fluticasone-salmeterol (ADVAIR-HFA) 230-21 MCG/ACT inhaler     levalbuterol (XOPENEX HFA) 45 MCG/ACT inhaler     metoprolol tartrate (LOPRESSOR) 50 MG tablet     PARoxetine (PAXIL) 40 MG tablet     vitamin C (ASCORBIC ACID) 500 MG tablet     Vitamin D3 (CHOLECALCIFEROL) 125 MCG (5000 UT) tablet     No current facility-administered medications for this visit.         11/21/2022   Do you avoid NSAIDs such as (Ibuprofen, Aleve, Naproxen, Advil)?   Yes       ROS:  GI:      11/21/2022   Vomiting: No   Diarrhea: No   Constipation: No   Swallowing trouble: No   Abdominal pain: No   Heartburn: No     Skin:   BAR RBS ROS - SKIN 11/21/2022   Rash in skin folds: Yes, takes a shower and keeps dry, needs no other intervention at this time.      Psych:      11/21/2022   Depression: No   Anxiety: Yes     Female Only: No flowsheet data found.    LABS/IMAGING/MEDICAL RECORDS REVIEW:   Hemoglobin A1C   Date Value Ref Range Status   12/27/2021 5.5 0.0 - 5.6 % Final     Comment:     Normal <5.7%   Prediabetes 5.7-6.4%    Diabetes  "6.5% or higher     Note: Adopted from ADA consensus guidelines.     Vitamin D, Total (25-Hydroxy)   Date Value Ref Range Status   12/27/2021 28 20 - 75 ug/L Final     Hemoglobin   Date Value Ref Range Status   05/27/2022 12.4 (L) 13.3 - 17.7 g/dL Final       Age less than 45, no DEXA indicated.    PHYSICAL EXAMINATION:  /76   Ht 5' 8\" (1.727 m)   Wt 265 lb (120.2 kg)   BMI 40.29 kg/m    GENERAL: Healthy, alert and no distress  EYES: Eyes grossly normal to inspection.  No discharge or erythema, or obvious scleral/conjunctival abnormalities.  RESP: No audible wheeze, cough, or visible cyanosis.  No visible retractions or increased work of breathing.    SKIN: Visible skin clear. No significant rash, abnormal pigmentation or lesions.  NEURO: Cranial nerves grossly intact.  Mentation and speech appropriate for age.  PSYCH: Mentation appears normal, affect normal/bright, judgement and insight intact, normal speech and appearance well-groomed.    COUNSELING PROVIDED:  We reviewed the important post op bariatric recommendations:  eating 3 meals daily  eating protein first, getting >60gm protein daily  eating slowly, chewing food well  avoiding/limiting calorie containing beverages  avoiding fluids 30 minutes before, during, and after meals  limiting restaurant or cafeteria eating to twice a week or less  Pt reminded to avoid marginal ulcers she should avoid tobacco at all, alcohol in excess, caffeine, and NSAIDS (unless indicated for cardioprotection or othewise and opposed by a PPI).  Pt encouraged to establish and maintain a consistent physical activity routine, 6-8 hours of restorative sleep each night and optimal stress management.  Pt counseled on the importance of life long vitamin supplementation and life long follow up.    Sincerely,    Elaina Wilkins PA-C      "

## 2022-11-27 NOTE — PROGRESS NOTES
"Video-Visit Details    Type of service:  Video Visit    Video Start Time (time video started): 8:25    Video End Time (time video stopped): 8:44    Originating Location (pt. Location): Home    Distant Location (provider location):  Off-site    Mode of Communication:  Video Conference via [employer]      NUTRITION POST OP APPOINTMENT  DATE OF VISIT: November 27, 2022    Bethel Woods  1975  male  [id number]  47 year old     ASSESSMENT:    REASON FOR VISIT:  Bethel is a 47 year old year old male presents today for 6 month PO nutrition follow-up appointment. Patient is accompanied by self.    DIAGNOSIS:  Status post gastric bypass surgery.  Obesity Grade III BMI >40     ANTHROPOMETRICS:  Initial Weight: 376.2 lb  Height: 68\"   Current Weight: 265 lb per pt    BMI: 40.29  kg/(m^2).    VITAMINS AND MINERALS:  1 [employer] Bariatric Multivitamin/ mineral with 18 mg iron-adequate thiamine, vitamin D) -7 to 8 am    650 mg Viactiv Calcium carbonate chew with Vitamin D-  BID at noon and 9 pm  5000 mcg vitamin B-12, liquid-one time per week    NUTRITION HISTORY:  Breakfast: protein drink  Lunch: deli beef  Supper: hamburger, mashed potatoes, carrots or corn  Snacks: protein drink with 25 or 40 grams protein, sugar free popsicle  Fluids consumed: at least 64 oz-water, protein drinks, Powerade Zero  Consuming liquid calories: Yes  Protein intake: 100-110 grams/day  Tolerate regular texture food: Yes  Any foods not tolerated details: Yes  If any food not tolerated: chicken  Portion size: 1/2-1 cup  Take 20-30 minutes to consume each meal: Yes   Eat protein foods first: Yes  Fluids and meals separate by at least 30 minutes: Yes  Chew foods thoroughly: Yes  Tolerating diet: Yes  Drinking high protein supplements: Yes  Consuming snacks per day: 0  Additional Information: Patient is pleased with his weight loss so far. Struggles with eating chicken cooked in a crock pot with no liquid. Discussed options to improve tolerance " "to chicken. Tried a piece of pumpkin pie and tolerated it ok. Had a Po bar at work and got \"dumping syndrome.\" Discussed avoiding foods with > 10 grams of sugar and total fat.        PHYSICAL ACTIVITY:  Type: gym-elliptical/treadmill  Frequency (days per week): 2  Duration (min): 60    DIAGNOSIS:  Previous Nutrition Diagnosis: Altered gastrointestinal function related to alteration in gastrointestinal structure as evidenced by history of gastric bypass surgery.- no change    Previous goals:  Restart exercise at the gym 2 times per week, working up to 60 minutes-met  Reduce protein drinks to 2 per day-met  Start to include 3 different food groups per meal (provided 1 week bariatric  sample menu)-not met  When done with current bottle of vitamin B-12 switch to a sublingual, liquid or nasal spray-met       Current Nutrition Diagnosis: Altered gastrointestinal function related to alteration in gastrointestinal structure as evidenced by history of gastric bypass surgery.    INTERVENTION:   Nutrition Prescription: Eat 3 meals a day at regular intervals. Consume 60-90 grams of protein daily. Follow post-surgical vitamin and mineral protocol.  Assessed learning needs and learning preferences. Will send via my chart: Preventing Dumping Syndrome after Weight Loss Surgery, Keeping Up Your Diet after Weight Loss Surgery, Food Label: The 10-10 Rule    GOALS:  Start to include 2-3 different food groups per meal  Select foods with less than 10 grams of sugar and total fat      Implementation: Discussed progress toward previous goals; reinforced importance of following bariatric lifestyle changes.    NUTRITION MONITORING AND EVALUATION:  Anticipated compliance: fair-good  Verbalized fair-good understanding.    Follow up: Patient to follow up in 6 months.    TIME SPENT WITH PATIENT:  19 minutes  Luisito Hair RD, BERTA  [facility]    "

## 2022-11-28 ENCOUNTER — VIRTUAL VISIT (OUTPATIENT)
Dept: SURGERY | Facility: CLINIC | Age: 47
End: 2022-11-28
Payer: COMMERCIAL

## 2022-11-28 VITALS
SYSTOLIC BLOOD PRESSURE: 127 MMHG | WEIGHT: 265 LBS | BODY MASS INDEX: 40.16 KG/M2 | HEIGHT: 68 IN | DIASTOLIC BLOOD PRESSURE: 76 MMHG

## 2022-11-28 DIAGNOSIS — E66.01 MORBID OBESITY (H): ICD-10-CM

## 2022-11-28 DIAGNOSIS — Z98.84 BARIATRIC SURGERY STATUS: Primary | ICD-10-CM

## 2022-11-28 DIAGNOSIS — K91.2 MALNUTRITION FOLLOWING GASTROINTESTINAL SURGERY: ICD-10-CM

## 2022-11-28 PROCEDURE — 99213 OFFICE O/P EST LOW 20 MIN: CPT | Mod: 95 | Performed by: PHYSICIAN ASSISTANT

## 2022-11-28 PROCEDURE — 97803 MED NUTRITION INDIV SUBSEQ: CPT | Mod: 95 | Performed by: DIETITIAN, REGISTERED

## 2022-11-28 NOTE — ASSESSMENT & PLAN NOTE
Continue taking recommended post-op vitamins.  Labs ordered per protocol will send results from PCP in Mychart and get remaining labs drawn at [facility] lab.

## 2022-11-28 NOTE — ASSESSMENT & PLAN NOTE
Patient was congratulated on success thus far. Healthy habits to assist with further weight loss were discussed.

## 2022-11-28 NOTE — PATIENT INSTRUCTIONS
Gopi Hugo-  It was great to visit with you and learn about your progress. Below are the goals we discussed.    GOALS:  Start to include 2-3 different food groups per meal  Select foods with less than 10 grams of sugar and total fat    Nutrition Educational Materials:  Keeping Up Your Diet after Weight Loss Surgery  [email or web address]  Preventing Dumping Syndrome after Weight Loss Surgery  [email or web address]   Food Label: The 10-10 Rule  [email or web address]       Please call [phone] to schedule your next visits with a Dietitian/ Provider (PA or MD) for 1 year post-op  Thanks!  Luisito Hair RD, LD  [facility]

## 2022-12-30 ENCOUNTER — LAB (OUTPATIENT)
Dept: LAB | Facility: CLINIC | Age: 47
End: 2022-12-30
Payer: COMMERCIAL

## 2022-12-30 DIAGNOSIS — K91.2 MALNUTRITION FOLLOWING GASTROINTESTINAL SURGERY: ICD-10-CM

## 2022-12-30 LAB
DEPRECATED CALCIDIOL+CALCIFEROL SERPL-MC: 27 UG/L (ref 20–75)
ERYTHROCYTE [DISTWIDTH] IN BLOOD BY AUTOMATED COUNT: 14.3 % (ref 10–15)
FERRITIN SERPL-MCNC: 30 NG/ML (ref 26–388)
HCT VFR BLD AUTO: 39 % (ref 40–53)
HGB BLD-MCNC: 12.9 G/DL (ref 13.3–17.7)
IRON SATN MFR SERPL: 16 % (ref 15–46)
IRON SERPL-MCNC: 49 UG/DL (ref 35–180)
MCH RBC QN AUTO: 28.4 PG (ref 26.5–33)
MCHC RBC AUTO-ENTMCNC: 33.1 G/DL (ref 31.5–36.5)
MCV RBC AUTO: 86 FL (ref 78–100)
PLATELET # BLD AUTO: 324 10E3/UL (ref 150–450)
PTH-INTACT SERPL-MCNC: 35 PG/ML (ref 15–65)
RBC # BLD AUTO: 4.55 10E6/UL (ref 4.4–5.9)
TIBC SERPL-MCNC: 313 UG/DL (ref 240–430)
VIT B12 SERPL-MCNC: 900 PG/ML (ref 232–1245)
WBC # BLD AUTO: 8 10E3/UL (ref 4–11)

## 2022-12-30 PROCEDURE — 85027 COMPLETE CBC AUTOMATED: CPT

## 2022-12-30 PROCEDURE — 83540 ASSAY OF IRON: CPT

## 2022-12-30 PROCEDURE — 82607 VITAMIN B-12: CPT

## 2022-12-30 PROCEDURE — 82728 ASSAY OF FERRITIN: CPT

## 2022-12-30 PROCEDURE — 82306 VITAMIN D 25 HYDROXY: CPT

## 2022-12-30 PROCEDURE — 83550 IRON BINDING TEST: CPT

## 2022-12-30 PROCEDURE — 83970 ASSAY OF PARATHORMONE: CPT

## 2022-12-30 PROCEDURE — 36415 COLL VENOUS BLD VENIPUNCTURE: CPT

## 2023-04-23 ENCOUNTER — HEALTH MAINTENANCE LETTER (OUTPATIENT)
Age: 48
End: 2023-04-23

## 2023-05-07 NOTE — PROGRESS NOTES
"Virtual Visit Details    Type of service:  Video Visit     Originating Location (pt. Location): Home  Distant Location (provider location):  On-site  Platform used for Video Visit: Well     NUTRITION POST OP APPOINTMENT  DATE OF VISIT: May 7, 2023    Bethel Woods  1975  male  [id number]  47 year old     ASSESSMENT:    REASON FOR VISIT:  Bethel is a 47 year old year old male presents today for 1 year PO nutrition follow-up appointment. Patient is accompanied by self.    DIAGNOSIS:  Status post gastric bypass surgery.  Obesity Grade II BMI 35-39.9     ANTHROPOMETRICS:  Initial Weight:  376.2 lb  Height: 68\"    Current Weight: 240 lb per pt    BMI: 36.49 kg/(m^2).    VITAMINS AND MINERALS:  1 [employer] Bariatric Multivitamin/ mineral with 18 mg iron-adequate thiamine, vitamin D) at-7 to 8 am    650 mg Viactiv Calcium carbonate chew with Vitamin D-  one time per day at 5:30 am  2000 international unit(s) vitamin D  5000 mcg vitamin B-12, liquid-one time per week      NUTRITION HISTORY:  Breakfast: 2 eggs  Lunch: ~ 2 oz deli turkey, banana  Supper: ~ 2 oz chicken or steak, sometimes mashed potatoes or carrots or green beans  Snacks: 1 protein drink (Muscle milk , no sugar added) 2-3 times per day, Sun Chips 4X per week  Fluids consumed:  70-90 oz water, 2-3 protein drinks, Powerade Zero, ETOH-1 beer in 6 months  Consuming liquid calories: Yes  Protein intake: 110-120 grams/day  Tolerate regular texture food: Yes  Any foods not tolerated details: No  Portion size: 1/2-1 cup  Take 20-30 minutes to consume each meal: Yes   Eat protein foods first: Yes  Fluids and meals separate by at least 30 minutes: Yes  Chew foods thoroughly: Yes  Tolerating diet: Yes  Drinking high protein supplements: Yes  Consuming snacks per day: 0-1  Additional Information: Patient has gone back to night shift (6 pm-6 am) 5X per week and this has been tough on his sleep pattern. Started to get hungry ~ 6-7 months post-op. Plans to set up a " gym in the garage. Would like to get weight down to 220 lb before having plastic surgery for excess skin removal.     MEDICATION FOR WEIGHT LOSS:  Plans to start Topamax        PHYSICAL ACTIVITY:  Type: none      DIAGNOSIS:  Previous Nutrition Diagnosis: Altered gastrointestinal function related to alteration in gastrointestinal structure as evidenced by history of gastric bypass surgery.- no change    Previous goals:  Start to include 2-3 different food groups per meal-not met  Select foods with less than 10 grams of sugar and total fat-improving      Current Nutrition Diagnosis: Altered gastrointestinal function related to alteration in gastrointestinal structure as evidenced by history of gastric bypass surgery.    INTERVENTION:   Nutrition Prescription: Eat 3 meals a day at regular intervals. Consume 60-90 grams of protein daily. Follow post-surgical vitamin and mineral protocol.  Assessed learning needs and learning preferences. Discussed high fiber food choices and will send Modified High Fiber MNT for bariatric surgery (sent through mail)  and Keeping Up Your Diet After Weight Loss Surgery.    GOALS:  Add a second dose of calcium (take at least 2 hours away from multivitamin/ mineral)  Exercise at least 2 times per week for at least 30 minutes  Start include 3 different food groups per meal  Aim to get in 10-15 grams of fiber per day      Implementation: Discussed progress toward previous goals; reinforced importance of following bariatric lifestyle changes.    NUTRITION MONITORING AND EVALUATION:  Anticipated compliance: fair-good  Verbalized good understanding.    Follow up: Patient to follow up in 2 months.    TIME SPENT WITH PATIENT:  25 minutes  Luisito Hair RD, BERTA  [facility]

## 2023-05-10 NOTE — PROGRESS NOTES
"Bethel is a 48 year old who is being evaluated via a billable video visit.      The patient has been notified of following:     \"This video visit will be conducted via a call between you and your physician/provider. We have found that certain health care needs can be provided without the need for an in-person physical exam.  This service lets us provide the care you need with a video conversation.  If a prescription is necessary we can send it directly to your pharmacy.  If lab work is needed we can place an order for that and you can then stop by our lab to have the test done at a later time.    Video visits are billed at different rates depending on your insurance coverage.  Please reach out to your insurance provider with any questions.    If during the course of the call the physician/provider feels a video visit is not appropriate, you will not be charged for this service.\"    Patient has given verbal consent for Video visit? Yes    How would you like to obtain your AVS? MyChart    If the video visit is dropped, the invitation should be resent by: Text to cell phone: [phone]    Will anyone else be joining your video visit? No    I    Video-Visit Details    Type of service:  Video Visit    Video Start Time: 8:13 AM    Video End Time:8:31 AM    Originating Location (pt. Location): Home    Distant Location (provider location):  [facility] [place]     Platform used for Video Visit: [facility] Bariatric Surgery Note    RE: Bethel Woods  MR#: [id number]  : 1975  VISIT DATE: 5/15/2023    Dear Rae Rios,    I had the pleasure of seeing your patient, Bethel Woods, in my post-bariatric surgery assessment clinic.    Assessment & Plan   Problem List Items Addressed This Visit     Class 2 severe obesity due to excess calories with serious comorbidity and body mass index (BMI) of 36.0 to 36.9 in adult (H)     Patient was congratulated on wt loss success thus " far. Has some hunger returning. We discussed medication that may assist with weight loss. Topiramate was prescribed. Risks/ benefits and possible side effects were discussed and questions were answered. Med info in AVS.                Relevant Medications    topiramate (TOPAMAX) 25 MG tablet    Other Relevant Orders    Comprehensive metabolic panel    Mild obstructive sleep apnea     Resolved.           Bariatric surgery status     5/26/2022 Laparoscopic Gastric Bypass LEL         Postsurgical malabsorption     Continue taking recommended post-op vitamins but add Viactiv chew to am.   Labs ordered per protocol.           Relevant Orders    CBC with platelets    Vitamin B12    Vitamin D Screen    Parathyroid Hormone Intact    Iron and Iron Binding Capacity    Ferritin    Comprehensive metabolic panel        PATIENT INSTRUCTIONS:  Labs ordered. Call [phone] to schedule.  Continue your bariatric vitamins but add Viactiv in am.   Start Topiramate (Remember to drink plenty of fluids daily)     FOLLOW-UP:  Call [phone] to schedule next visit in 3 mo    31 minutes spent on the date of the encounter doing chart review, history and exam, result review, counseling, developing plan of care, documentation, and further activities as noted        CHIEF COMPLAINT: Post-bariatric surgery follow-up    HISTORY OF PRESENT ILLNESS:      11/21/2022    12:10 PM   Questions Regarding Prior Weight Loss Surgery Reviewed With Patient   I had the following weight loss procedure Donnie-en-y Gastric Bypass   What year was your surgery? 2022   How has your weight changed since your last visit? I have lost weight   Are you currently taking any weight loss medications? No   Do you currently have any of the following None of the above   ER since last visit Yes   Hospital since last visit No   Do you have any concerns today? No   Hasn't been able to get to the gym because heee switch from day shift to night shift.     Weight History:       11/21/2022    12:10 PM   --   What is your highest lifetime weight? 390   What is your lowest weight since surgery? (In pounds) 270     Initial Weight (lbs): 376.23 lbs  Weight: 240 lb (108.9 kg)  Last Visits Weight: 265 lb (120.2 kg)  Cumulative weight loss (lbs): 136.23  Weight Loss Percentage: 36.21%    Patient is taking the following bariatric postoperative vitamins:  1 [employer] Bariatric Multivitamin/ mineral with 18 mg iron-adequate thiamine, vitamin D) 5 am   650 mg Viactiv Calcium carbonate chew with Vitamin D-   at 5 PM  5000 mcg vitamin B-12, tablet-one time per week        11/21/2022    12:10 PM   Questions Regarding Co-Morbidities and Health Concerns Reviewed With Patient   Pre-diabetes Gone away   Diabetes II Never   High Blood Pressure Gone Away   High cholesterol Never   Heartburn/Reflux Gone away   Sleep apnea Gone away   PCOS Never   Back pain Stayed the same   Joint pain Stayed the same   Lower leg swelling Improved              Eating Habits   How many meals do you eat per day? 3   Do you snack between meals? Sometimes   How much food are you eating at each meal? 1 cup   Are you able to separate your meals and liquids by at least 30 minutes? Yes   Are you able to avoid liquid calories? Yes   Is noticing he is having a little bit of hunger at work so he starts to snack.               Exercise Questions Reviewed With Patient   How often do you exercise? Currently none, just switched schedule at work to nights and haven't gotten into a routine.  Was 3-4 times a week at gym for 60+min   What is the duration of your exercise (in minutes)? -   What types of exercise do you do? -  -    What keeps you from being more active? Work   Too tired   Is planning to buy equipment at home.      Social History:           --   Are you smoking? No   Are you drinking alcohol? Had 1 since surgery       Medications:  Current Outpatient Medications   Medication     topiramate (TOPAMAX) 25 MG tablet     albuterol  (PROAIR HFA/PROVENTIL HFA/VENTOLIN HFA) 108 (90 Base) MCG/ACT inhaler     ALPRAZolam (XANAX) 0.5 MG tablet     calcium carbonate (OS-SALLY) 1500 (600 Ca) MG tablet     fluticasone-salmeterol (ADVAIR-HFA) 230-21 MCG/ACT inhaler     levalbuterol (XOPENEX HFA) 45 MCG/ACT inhaler     metoprolol tartrate (LOPRESSOR) 50 MG tablet     PARoxetine (PAXIL) 40 MG tablet     vitamin C (ASCORBIC ACID) 500 MG tablet     Vitamin D3 (CHOLECALCIFEROL) 125 MCG (5000 UT) tablet     No current facility-administered medications for this visit.         11/21/2022    12:10 PM   --   Do you avoid NSAIDs such as (Ibuprofen, Aleve, Naproxen, Advil)?   Yes       ROS:         --   Vomiting: No   Diarrhea: No   Constipation: No   Swallowing trouble: No   Abdominal pain: No   Heartburn: No   Rash in skin folds: Yes, but if in takes shower daily and dries well.    Depression: No   Stress urinary incontinence No   AOM ROS:    Topiramate:  Deniexs H/O kidney stones, kidney disease, Creatine normal   ronic Malabsorption syndrome    LABS/IMAGING/MEDICAL RECORDS REVIEW:   Hemoglobin A1C   Date Value Ref Range Status   12/27/2021 5.5 0.0 - 5.6 % Final     Comment:     Normal <5.7%   Prediabetes 5.7-6.4%    Diabetes 6.5% or higher     Note: Adopted from ADA consensus guidelines.     Vitamin D, Total (25-Hydroxy)   Date Value Ref Range Status   12/30/2022 27 20 - 75 ug/L Final     Parathyroid Hormone Intact   Date Value Ref Range Status   12/30/2022 35 15 - 65 pg/mL Final     Vitamin B12   Date Value Ref Range Status   12/30/2022 900 232 - 1,245 pg/mL Final     Hemoglobin   Date Value Ref Range Status   12/30/2022 12.9 (L) 13.3 - 17.7 g/dL Final     Ferritin   Date Value Ref Range Status   12/30/2022 30 26 - 388 ng/mL Final     Iron   Date Value Ref Range Status   12/30/2022 49 35 - 180 ug/dL Final     Iron Binding Capacity   Date Value Ref Range Status   12/30/2022 313 240 - 430 ug/dL Final     Iron Sat Index   Date Value Ref Range Status   12/30/2022 16  "15 - 46 % Final       PHYSICAL EXAMINATION:  Ht 5' 8\" (1.727 m)   Wt 240 lb (108.9 kg)   BMI 36.49 kg/m    GENERAL: Healthy, alert and no distress  EYES: Eyes grossly normal to inspection.  No discharge or erythema, or obvious scleral/conjunctival abnormalities.  RESP: No audible wheeze, cough, or visible cyanosis.  No visible retractions or increased work of breathing.    SKIN: Visible skin clear. No significant rash, abnormal pigmentation or lesions.  NEURO: Cranial nerves grossly intact.  Mentation and speech appropriate for age.  PSYCH: Mentation appears normal, affect normal/bright, judgement and insight intact, normal speech and appearance well-groomed.    COUNSELING PROVIDED:  We reviewed the important post op bariatric recommendations:  eating 3 meals daily  eating protein first, getting >60gm protein daily  eating slowly, chewing food well  avoiding/limiting calorie containing beverages  avoiding fluids 30 minutes before, during, and after meals  limiting restaurant or cafeteria eating to twice a week or less  Pt reminded to avoid marginal ulcers she should avoid tobacco at all, alcohol in excess, caffeine, and NSAIDS (unless indicated for cardioprotection or othewise and opposed by a PPI).  Pt encouraged to establish and maintain a consistent physical activity routine, 6-8 hours of restorative sleep each night and optimal stress management.  Pt counseled on the importance of life long vitamin supplementation and life long follow up.    Sincerely,    Elaina Wilkins PA-C      "

## 2023-05-15 ENCOUNTER — VIRTUAL VISIT (OUTPATIENT)
Dept: SURGERY | Facility: CLINIC | Age: 48
End: 2023-05-15
Payer: COMMERCIAL

## 2023-05-15 VITALS — HEIGHT: 68 IN | WEIGHT: 240 LBS | BODY MASS INDEX: 36.37 KG/M2

## 2023-05-15 DIAGNOSIS — E66.812 CLASS 2 SEVERE OBESITY DUE TO EXCESS CALORIES WITH SERIOUS COMORBIDITY AND BODY MASS INDEX (BMI) OF 36.0 TO 36.9 IN ADULT (H): Primary | ICD-10-CM

## 2023-05-15 DIAGNOSIS — K91.2 POSTSURGICAL MALABSORPTION: ICD-10-CM

## 2023-05-15 DIAGNOSIS — Z98.84 BARIATRIC SURGERY STATUS: ICD-10-CM

## 2023-05-15 DIAGNOSIS — E66.01 CLASS 2 SEVERE OBESITY DUE TO EXCESS CALORIES WITH SERIOUS COMORBIDITY AND BODY MASS INDEX (BMI) OF 36.0 TO 36.9 IN ADULT (H): ICD-10-CM

## 2023-05-15 DIAGNOSIS — E66.812 CLASS 2 SEVERE OBESITY DUE TO EXCESS CALORIES WITH SERIOUS COMORBIDITY AND BODY MASS INDEX (BMI) OF 36.0 TO 36.9 IN ADULT (H): ICD-10-CM

## 2023-05-15 DIAGNOSIS — E66.01 CLASS 2 SEVERE OBESITY DUE TO EXCESS CALORIES WITH SERIOUS COMORBIDITY AND BODY MASS INDEX (BMI) OF 36.0 TO 36.9 IN ADULT (H): Primary | ICD-10-CM

## 2023-05-15 DIAGNOSIS — G47.33 MILD OBSTRUCTIVE SLEEP APNEA: ICD-10-CM

## 2023-05-15 PROCEDURE — 97803 MED NUTRITION INDIV SUBSEQ: CPT | Mod: VID

## 2023-05-15 PROCEDURE — 99214 OFFICE O/P EST MOD 30 MIN: CPT | Mod: VID | Performed by: PHYSICIAN ASSISTANT

## 2023-05-15 RX ORDER — TOPIRAMATE 25 MG/1
TABLET, FILM COATED ORAL
Qty: 270 TABLET | Refills: 0 | Status: SHIPPED | OUTPATIENT
Start: 2023-05-15 | End: 2024-07-25 | Stop reason: SINTOL

## 2023-05-15 NOTE — ASSESSMENT & PLAN NOTE
Continue taking recommended post-op vitamins but add Viactiv chew to am.   Labs ordered per protocol.

## 2023-05-15 NOTE — PATIENT INSTRUCTIONS
"To ensure the quality you may receive a patient satisfaction survey. The greatest compliment you can give is \"Likely to Recommend\"    Nice to talk with you today. Thank you for allowing me the privilege of caring for you. Below is the plan discussed.-  MARSHALL Delaney      Plan:  Labs ordered. Call [phone] to schedule.  Continue your bariatric vitamins but add Viactiv in am.     Start Topiramate (Remember to drink plenty of fluids daily)   Week 1:Take 1 tablet (25 mg) at bedtime  Week 2 Take 2 tablets (50 mg) at bedtime  Week 3:Take 3 tablets (75 mg) at bedtime Stay at 3 tabs until you are seen again.     FOLLOW-UP:  Call [phone] to schedule next visit in 3 mo    TOPIRAMATE (TOPAMAX)    Topiramate (Topamax) is a medication that is used most often to treat migraine headaches or for seizures. It has also been found to help with weight loss. Although it's not currently FDA approved for weight loss, it has been used safely for a number of years to help people who are carrying extra weight.     Just how topiramate helps with weight loss has not been exactly determined. However it seems to work on areas of the brain to quiet down signals related to eating.      Topiramate may make you:    >feel less interest in eating in between meals   >think less about food and eating   >find it easier to push the plate away   >find giving up pop easier    >have an easier time eating less    Side-effects Topiramate is generally well tolerated. The main side-effects we see are:   Tingling in hands,feet, or face (usually not very troublesome)   Mental confusion and word finding trouble (about 10% of patients have this.)     Feeling sleepy or a bit dopey- this goes away very soon after starting.       Bariatric Post Op Guidelines  General:    To avoid marginal ulcers avoid all forms of tobacco, alcohol in excess, caffeine, and NSAIDS     Exercise is key for weight loss and weight maintenance. Aim for 30-60 minutes of physical " activity most days.  Include cardiovascular and strength training.    Continue lifelong vitamins supplementation and annual lab follow up.  All  patients should supplement with the following bariatric postoperative vitamins:  2 Complete multivitamins with minerals (at different times than calcium)  Vitamin D 5000 Int Units/125 mg daily   Calcium 600 mg twice daily or 500 mg three times daily   Vitamin B12: 500 mcg sl daily or 1000 mcg Inj monthly  B complex daily or Thiamine 100 mg weekly  1 Iron/Vit C. Daily for females who menstruate and/or as directed    The bariatric team should be aware and evaluate all GI symptoms which can be a sign of complications. Inability to tolerate textured solid food (chicken, steak, fish) may need to be evaluated by endoscopy.    There is a 10% increase of Alcohol Use Disorder in patients with bariatric surgery.   Most often occurring around 2 years post op.  Call if you feel alcohol is interfering in your daily life.  We can help.     Follow up annually lifelong. Obesity is a chronic disease.  Weight gain can be expected. The goal of follow-up visits is to ensure adequate vitamin and protein absorption, evaluate food intake behavior, review exercise/activity level, and assist with weight regain.    Nutritional:  Eat 3 meals per day  (No snacks between meals.)  Do not skip meals.  This can cause overeating at the next meal and will prevent adequate protein and nutritional intake.    Aim for 60-80 grams of protein per day.  Always eat your protein first. This assists with optimal nutrition and helps you stay full longer.    Eat your protein first, and then follow with fiber.    Add fiber by including fruits, vegetables, whole grains, and beans.     Portions should be about 1 cup per meal. Use measuring cups to be accurate.  Continue to use saucer/salad plates, infant/toddler silverware to keep portion sizes small and take small bites.    Eat S-L-O-W-L-Y to make each meal last 20-30  minutes. Always stop eating when satisfied.    Aim for 64 oz. of calorie-free fluids daily.    Avoid drinking 30 min before, during, and 30 min after meal    Avoid high sugar and high fat foods to prevent high calorie intake. This will reduce your rate of weight loss and can cause weight regain.   Check nutrition labels for less than 10 grams of sugar and less than 10 grams of fat per serving.

## 2023-05-15 NOTE — ASSESSMENT & PLAN NOTE
Patient was congratulated on wt loss success thus far. Has some hunger returning. We discussed medication that may assist with weight loss. Topiramate was prescribed. Risks/ benefits and possible side effects were discussed and questions were answered. Med info in AVS.

## 2023-06-26 ENCOUNTER — DOCUMENTATION ONLY (OUTPATIENT)
Dept: SURGERY | Facility: CLINIC | Age: 48
End: 2023-06-26
Payer: COMMERCIAL

## 2023-08-14 DIAGNOSIS — E66.812 CLASS 2 SEVERE OBESITY DUE TO EXCESS CALORIES WITH SERIOUS COMORBIDITY AND BODY MASS INDEX (BMI) OF 36.0 TO 36.9 IN ADULT (H): ICD-10-CM

## 2023-08-14 DIAGNOSIS — E66.01 CLASS 2 SEVERE OBESITY DUE TO EXCESS CALORIES WITH SERIOUS COMORBIDITY AND BODY MASS INDEX (BMI) OF 36.0 TO 36.9 IN ADULT (H): ICD-10-CM

## 2023-08-14 RX ORDER — TOPIRAMATE 25 MG/1
TABLET, FILM COATED ORAL
Qty: 270 TABLET | Refills: 0 | OUTPATIENT
Start: 2023-08-14

## 2023-08-14 NOTE — TELEPHONE ENCOUNTER
Called pt re: Topiramate  No answer. Asked to call clinic, number provided or asked to respond to [facility] message.  Tami Fountain RN on 8/14/2023 at 9:51 AM

## 2023-08-14 NOTE — TELEPHONE ENCOUNTER
Pt returned call.  No longer wishes to be on topiramate as not working.  Will send  msg with how to wean.  Is scheduled with provider for next avail 11/17/23 [id number].  Ivone Louis MS, RD, RN

## 2023-10-26 ENCOUNTER — TELEPHONE (OUTPATIENT)
Dept: SURGERY | Facility: CLINIC | Age: 48
End: 2023-10-26
Payer: COMMERCIAL

## 2023-10-26 NOTE — TELEPHONE ENCOUNTER
Attempted to reach patient for previsit, no answer.    PREVISIT INFORMATION                                                    Bethel Woods scheduled for future visit at [facility] specialty clinics.    Patient is scheduled to see Dr. Santiago on 10/27/23  Reason for visit: Excess skin removal cosult  Referring provider Elaina Wilkins PA-C   Has patient seen previous specialist? No  Medical Records:  Available in chart.  Patient was previously seen at a [facility] or [facility] facility.    REVIEW                                                      New patient packet mailed to patient: No  Medication reconciliation complete: No      Current Outpatient Medications   Medication Sig Dispense Refill    albuterol (PROAIR HFA/PROVENTIL HFA/VENTOLIN HFA) 108 (90 Base) MCG/ACT inhaler Inhale 2 puffs into the lungs every 4 hours as needed for shortness of breath / dyspnea or wheezing      ALPRAZolam (XANAX) 0.5 MG tablet Take 0.5 mg by mouth daily as needed for anxiety      calcium carbonate (OS-SALLY) 1500 (600 Ca) MG tablet Take 600 mg by mouth 2 times daily (with meals)      fluticasone-salmeterol (ADVAIR-HFA) 230-21 MCG/ACT inhaler Inhale 2 puffs into the lungs 2 times daily      levalbuterol (XOPENEX HFA) 45 MCG/ACT inhaler       metoprolol tartrate (LOPRESSOR) 50 MG tablet Take 50 mg by mouth 2 times daily      PARoxetine (PAXIL) 40 MG tablet Take 1 tablet by mouth daily       topiramate (TOPAMAX) 25 MG tablet Take 25 mg daily week 1, increase to 50 mg daily week 2, then increase to 75 mg daily week 3 and beyond 270 tablet 0    vitamin C (ASCORBIC ACID) 500 MG tablet Take 1,000 mg by mouth daily      Vitamin D3 (CHOLECALCIFEROL) 125 MCG (5000 UT) tablet Take 125 mcg by mouth daily         Allergies: Asa [aspirin], Nsaids, Phentermine, and Sertraline        PLAN/FOLLOW-UP NEEDED                                                      Previsit review complete.  Patient will see provider  at future scheduled appointment.     Patient Reminders Given:  Please, make sure you bring an updated list of your medications.   If you are having a procedure, please, present 15 minutes early.  If you need to cancel or reschedule,please call [phone].    Kat Mathur LPN

## 2023-10-27 ENCOUNTER — OFFICE VISIT (OUTPATIENT)
Dept: SURGERY | Facility: CLINIC | Age: 48
End: 2023-10-27
Attending: PHYSICIAN ASSISTANT
Payer: COMMERCIAL

## 2023-10-27 VITALS — WEIGHT: 261.6 LBS | HEIGHT: 68 IN | BODY MASS INDEX: 39.65 KG/M2

## 2023-10-27 DIAGNOSIS — Z98.84 BARIATRIC SURGERY STATUS: ICD-10-CM

## 2023-10-27 DIAGNOSIS — E65 ABDOMINAL PANNUS: ICD-10-CM

## 2023-10-27 PROCEDURE — 99204 OFFICE O/P NEW MOD 45 MIN: CPT | Performed by: STUDENT IN AN ORGANIZED HEALTH CARE EDUCATION/TRAINING PROGRAM

## 2023-10-27 NOTE — NURSING NOTE
"Bethel Woods's chief complaint for this visit includes:  Chief Complaint   Patient presents with    New Patient     Post weight loss skin removal, Per Pt // (rescheduled from 8/25 per pt request, pt will not have health insurance until September)     PCP: Rae Rios    Referring Provider:  Elaina Wilkins, PA-C  [address]  [place] [address]    Ht 1.727 m (5' 8\")   Wt 118.7 kg (261 lb 9.6 oz)   BMI 39.78 kg/m    Data Unavailable        Allergies   Allergen Reactions    Asa [Aspirin]      Gastric Bypass    Nsaids      Gastric Bypass    Phentermine      Elevated Blood pressure and panic attacks    Sertraline      Felt like I was going to pass out.         Do you need any medication refills at today's visit? Siomara layton Clinic Assistant- Surgical Specialties         "

## 2023-10-27 NOTE — LETTER
"    10/27/2023         RE: Bethel Woods  [address] [facility] [address]        Dear Colleague,    Thank you for referring your patient, Bethel Woods, to the [facility]. Please see a copy of my visit note below.    PRS    HPI: 48-year-old male status post gastric bypass with massive weight loss presenting with lower abdominal skin excess and panniculitis.  Patient had a high weight of approximately 400 pounds.  After bypass, patient lost 150 pounds.  He is very motivated to lose at least an additional 30 pounds.  This would make his ideal low body weight of approximately 220 pounds.  He is currently 260 or so pounds.  Patient complains of lower abdominal skin excess that is causing intertrigo as well as difficulty fitting clothing including wearing a belt, and certain types of exercise such as running.  In terms of the intertrigo, patient has had 2 use topical ointment and did maintain dryness underneath the pannus.  No use of antifungal cream or powder, and no evidence or bouts of infection.  Patient has difficulty fitting clothing daily.  He cannot use a belt because his pannus gets in the way.  Additionally, exercise is difficult and probably limited by the presence of the large lower abdominal pannus.    ROS: Negative, see HPI  Past medical history: Nondiabetic, history of atrial fibrillation status post ablation  Past surgical history: Gastric bypass on 5/26/2022.  Medications: No blood thinners  Allergies: Aspirin, nonsteroidal anti-inflammatory medications, sertraline, phentermine  Social history: Non-smoker, denies any tobacco or nicotine use  Family history: No bleeding or clotting problems, or issues with anesthesia    Examination:  Ht 1.727 m (5' 8\")   Wt 118.7 kg (261 lb 9.6 oz)   BMI 39.78 kg/m    Nonlabored breathing  Not distressed  Lower abdominal pannus with skin excess and lipodystrophy with associated skin irritation with intertrigo  No palpable abdominal " hernia  Prior well-healed laparoscopic scars    CT 5/2023:   1.  No renal or ureteral calculi. No hydronephrosis.   2.  Mildly increased stool volume in the colon without obstruction or inflammation.   3.  Old left rib fractures.   4.  Small fat-containing right inguinal hernia.     A/P: 48-year-old male status post gastric bypass 150+ pound weight loss presenting with lower abdominal pannus    -Since patient is very motivated for additional weight loss and has a clear plan, my recommendation is for the patient to lose additional weight.  Since his goal is at least 30 pounds, this approximates 12-13% of his current body weight, such that it represents a substantial potential additional weight loss.  In general, for body contouring, surgery is reserved in situations where patient is stable at that new low weight in order to be able to remove as much skin as possible.  Patient understands and is agreeable to the plan.  -Patient will return after 2-3 months of weight stability at new low weight  -Defer photography for now  -A total of 45 minutes was devoted to review of chart, direct face-to-face patient counseling and documentation during this encounter, exclusive of any procedure performed.    Rubén Santiago MD, PhD        Again, thank you for allowing me to participate in the care of your patient.        Sincerely,        Rubén Santiago MD

## 2023-10-27 NOTE — PROGRESS NOTES
"PRS    HPI: 48-year-old male status post gastric bypass with massive weight loss presenting with lower abdominal skin excess and panniculitis.  Patient had a high weight of approximately 400 pounds.  After bypass, patient lost 150 pounds.  He is very motivated to lose at least an additional 30 pounds.  This would make his ideal low body weight of approximately 220 pounds.  He is currently 260 or so pounds.  Patient complains of lower abdominal skin excess that is causing intertrigo as well as difficulty fitting clothing including wearing a belt, and certain types of exercise such as running.  In terms of the intertrigo, patient has had 2 use topical ointment and did maintain dryness underneath the pannus.  No use of antifungal cream or powder, and no evidence or bouts of infection.  Patient has difficulty fitting clothing daily.  He cannot use a belt because his pannus gets in the way.  Additionally, exercise is difficult and probably limited by the presence of the large lower abdominal pannus.    ROS: Negative, see HPI  Past medical history: Nondiabetic, history of atrial fibrillation status post ablation  Past surgical history: Gastric bypass on 5/26/2022.  Medications: No blood thinners  Allergies: Aspirin, nonsteroidal anti-inflammatory medications, sertraline, phentermine  Social history: Non-smoker, denies any tobacco or nicotine use  Family history: No bleeding or clotting problems, or issues with anesthesia    Examination:  Ht 1.727 m (5' 8\")   Wt 118.7 kg (261 lb 9.6 oz)   BMI 39.78 kg/m    Nonlabored breathing  Not distressed  Lower abdominal pannus with skin excess and lipodystrophy with associated skin irritation with intertrigo  No palpable abdominal hernia  Prior well-healed laparoscopic scars    CT 5/2023:   1.  No renal or ureteral calculi. No hydronephrosis.   2.  Mildly increased stool volume in the colon without obstruction or inflammation.   3.  Old left rib fractures.   4.  Small " fat-containing right inguinal hernia.     A/P: 48-year-old male status post gastric bypass 150+ pound weight loss presenting with lower abdominal pannus    -Since patient is very motivated for additional weight loss and has a clear plan, my recommendation is for the patient to lose additional weight.  Since his goal is at least 30 pounds, this approximates 12-13% of his current body weight, such that it represents a substantial potential additional weight loss.  In general, for body contouring, surgery is reserved in situations where patient is stable at that new low weight in order to be able to remove as much skin as possible.  Patient understands and is agreeable to the plan.  -Patient will return after 2-3 months of weight stability at new low weight  -Defer photography for now  -A total of 45 minutes was devoted to review of chart, direct face-to-face patient counseling and documentation during this encounter, exclusive of any procedure performed.    Rubén Santiago MD, PhD

## 2023-12-08 ENCOUNTER — DOCUMENTATION ONLY (OUTPATIENT)
Dept: SURGERY | Facility: CLINIC | Age: 48
End: 2023-12-08
Payer: COMMERCIAL

## 2024-02-03 ENCOUNTER — DOCUMENTATION ONLY (OUTPATIENT)
Dept: SURGERY | Facility: CLINIC | Age: 49
End: 2024-02-03

## 2024-02-03 ENCOUNTER — LAB (OUTPATIENT)
Dept: LAB | Facility: CLINIC | Age: 49
End: 2024-02-03
Attending: PHYSICIAN ASSISTANT
Payer: COMMERCIAL

## 2024-02-03 DIAGNOSIS — E66.01 CLASS 2 SEVERE OBESITY DUE TO EXCESS CALORIES WITH SERIOUS COMORBIDITY AND BODY MASS INDEX (BMI) OF 36.0 TO 36.9 IN ADULT (H): Primary | ICD-10-CM

## 2024-02-03 DIAGNOSIS — E66.812 CLASS 2 SEVERE OBESITY DUE TO EXCESS CALORIES WITH SERIOUS COMORBIDITY AND BODY MASS INDEX (BMI) OF 36.0 TO 36.9 IN ADULT (H): ICD-10-CM

## 2024-02-03 DIAGNOSIS — E66.812 CLASS 2 SEVERE OBESITY DUE TO EXCESS CALORIES WITH SERIOUS COMORBIDITY AND BODY MASS INDEX (BMI) OF 36.0 TO 36.9 IN ADULT (H): Primary | ICD-10-CM

## 2024-02-03 DIAGNOSIS — E66.01 CLASS 2 SEVERE OBESITY DUE TO EXCESS CALORIES WITH SERIOUS COMORBIDITY AND BODY MASS INDEX (BMI) OF 36.0 TO 36.9 IN ADULT (H): ICD-10-CM

## 2024-02-03 DIAGNOSIS — K91.2 POSTSURGICAL MALABSORPTION: ICD-10-CM

## 2024-02-03 LAB
ERYTHROCYTE [DISTWIDTH] IN BLOOD BY AUTOMATED COUNT: 12.5 % (ref 10–15)
FERRITIN SERPL-MCNC: 28 NG/ML (ref 31–409)
HCT VFR BLD AUTO: 42.3 % (ref 40–53)
HGB BLD-MCNC: 14.3 G/DL (ref 13.3–17.7)
IRON BINDING CAPACITY (ROCHE): 339 UG/DL (ref 240–430)
IRON SATN MFR SERPL: 19 % (ref 15–46)
IRON SERPL-MCNC: 64 UG/DL (ref 61–157)
MCH RBC QN AUTO: 29.2 PG (ref 26.5–33)
MCHC RBC AUTO-ENTMCNC: 33.8 G/DL (ref 31.5–36.5)
MCV RBC AUTO: 87 FL (ref 78–100)
PLATELET # BLD AUTO: 275 10E3/UL (ref 150–450)
PTH-INTACT SERPL-MCNC: 36 PG/ML (ref 15–65)
RBC # BLD AUTO: 4.89 10E6/UL (ref 4.4–5.9)
VIT B12 SERPL-MCNC: 977 PG/ML (ref 232–1245)
VIT D+METAB SERPL-MCNC: 23 NG/ML (ref 20–50)
WBC # BLD AUTO: 6.4 10E3/UL (ref 4–11)

## 2024-02-03 PROCEDURE — 36415 COLL VENOUS BLD VENIPUNCTURE: CPT

## 2024-02-03 PROCEDURE — 82728 ASSAY OF FERRITIN: CPT

## 2024-02-03 PROCEDURE — 83540 ASSAY OF IRON: CPT

## 2024-02-03 PROCEDURE — 82306 VITAMIN D 25 HYDROXY: CPT

## 2024-02-03 PROCEDURE — 85027 COMPLETE CBC AUTOMATED: CPT

## 2024-02-03 PROCEDURE — 82607 VITAMIN B-12: CPT

## 2024-02-03 PROCEDURE — 80053 COMPREHEN METABOLIC PANEL: CPT

## 2024-02-03 PROCEDURE — 83970 ASSAY OF PARATHORMONE: CPT

## 2024-02-03 PROCEDURE — 83550 IRON BINDING TEST: CPT

## 2024-02-03 NOTE — PROGRESS NOTES
Patient was at [facility] for lab draw on 2/3/24. Most lab tests were drawn except for  the CMP due to  order.    Please review the patient's chart and place an add-on order of the CMP if the test is needed.    [facility] lab staff

## 2024-02-05 LAB
ALBUMIN SERPL BCG-MCNC: 4 G/DL (ref 3.5–5.2)
ALP SERPL-CCNC: 87 U/L (ref 40–150)
ALT SERPL W P-5'-P-CCNC: 28 U/L (ref 0–70)
ANION GAP SERPL CALCULATED.3IONS-SCNC: 13 MMOL/L (ref 7–15)
AST SERPL W P-5'-P-CCNC: 27 U/L (ref 0–45)
BILIRUB SERPL-MCNC: 0.4 MG/DL
BUN SERPL-MCNC: 18.5 MG/DL (ref 6–20)
CALCIUM SERPL-MCNC: 9.4 MG/DL (ref 8.6–10)
CHLORIDE SERPL-SCNC: 106 MMOL/L (ref 98–107)
CREAT SERPL-MCNC: 0.64 MG/DL (ref 0.67–1.17)
DEPRECATED HCO3 PLAS-SCNC: 23 MMOL/L (ref 22–29)
EGFRCR SERPLBLD CKD-EPI 2021: >90 ML/MIN/1.73M2
GLUCOSE SERPL-MCNC: 116 MG/DL (ref 70–99)
POTASSIUM SERPL-SCNC: 4.1 MMOL/L (ref 3.4–5.3)
PROT SERPL-MCNC: 7 G/DL (ref 6.4–8.3)
SODIUM SERPL-SCNC: 142 MMOL/L (ref 135–145)

## 2024-05-20 NOTE — PROGRESS NOTES
"Bethel is a 49 year old who is being evaluated via a billable video visit.      The patient has been notified of following:     \"This video visit will be conducted via a call between you and your physician/provider. We have found that certain health care needs can be provided without the need for an in-person physical exam.  This service lets us provide the care you need with a video conversation.  If a prescription is necessary we can send it directly to your pharmacy.  If lab work is needed we can place an order for that and you can then stop by our lab to have the test done at a later time.    Video visits are billed at different rates depending on your insurance coverage.  Please reach out to your insurance provider with any questions.    If during the course of the call the physician/provider feels a video visit is not appropriate, you will not be charged for this service.\"    Patient has given verbal consent for Video visit? Yes    How would you like to obtain your AVS? MyChart    If the video visit is dropped, the invitation should be resent by: Text to cell phone: [phone]    Will anyone else be joining your video visit? No    I    Video-Visit Details    Type of service:  Video Visit    Originating Location (pt. Location): Home    Distant Location (provider location):   Off-Site - Provider Home Office    Platform used for Video Visit: [email or web address]    Video Start Time: 9:09 AM    Video End Time:9:30 AM    2024      Return Medical Weight Management Note     Bethel Woods  MRN:  [id number]  :  1975    Assessment & Plan   Problem List Items Addressed This Visit       Class 2 severe obesity due to excess calories with serious comorbidity and body mass index (BMI) of 36.0 to 36.9 in adult (H)    Relevant Medications    naltrexone (DEPADE/REVIA) 50 MG tablet    Bariatric surgery status - Primary     2022 Laparoscopic Gastric Bypass LEL         Postsurgical malabsorption     Continue taking recommended " post-op vitamins.  Double amount of daily vitamin D   Reviewed labs drawn in February.  See dietitian to review bariatric vitamins.  Recommend patient on bariatric vitamin that has at least 45 mg of iron in it              Other Visit Diagnoses       Abnormal craving        Relevant Medications    naltrexone (DEPADE/REVIA) 50 MG tablet    Other Relevant Orders    Med Therapy Management Referral             AOM Considerations:  Phentermine:  Would avoid with anxiety, PAF  Topiramate:  Not effective  GLP-1:  Not covered by insurance   Naltrexone:  As cravings, will start  Wellbutrin:  On other psych medications.  Has anxiety caution  Metformin:  Last HgA1C 5.5, was on previous to bariatric surgery for weight management with no real effect.  Will recheck hemoglobin A1c when getting LFTs in 3 months  Contrave: No AOM coverage   Qsymia: No AOM coverage     PATIENT INSTRUCTIONS:  Double dose of daily vitamin D    Start Naltrexone.   Directions: Take 1/2 tab 1-2 hours prior to biggest cravings or extra hunger for a week.  If tolerating, increase to 1/2 tablet twice daily.     Labs ordered.  Please call [phone] to set up a lab appt in 3 months    Follow up:    Call [phone] to schedule next visit in   May follow-up with dietitian now for annual visit and review of vitamins    43 minutes spent on the date of the encounter doing chart review, history and exam, result review, counseling, developing plan of care, documentation, and further activities as noted      INTERVAL HISTORY:  Bethel returns for medical weight management follow up.  Last seen on 5/15/2023 for annual  bariatric appt. S/P 2022 Laparoscopic Gastric Bypass LEL. Started on Topiramate but it really was not working so he stopped it.  Did not work to curb cravings.  Interested in other options.     Was lost to follow-up this year.  Brother  in December and then his dog  near the time of our appointments.    Is on Paxil since  and stable.   Did try other selective serotonin reuptake inhibitors but had many side effects.    In Feb weight was down to 235.  Then cravings started to come back.    Brother  in December.      WEIGHT METRICS:  Body mass index is 38.01 kg/m .   Current Weight: 250 lb (113.4 kg)  Last Visits Weight: 261 lb (118.4 kg)  Initial Weight (lbs): 376.2 lbs  Cumulative weight loss (lbs): 126.2  Weight Loss Percentage: 33.55%    Wt Readings from Last 10 Encounters:   24 250 lb (113.4 kg)   10/27/23 261 lb 9.6 oz (118.7 kg)   05/15/23 240 lb (108.9 kg)   22 265 lb (120.2 kg)   22 301 lb 14.4 oz (136.9 kg)   22 327 lb (148.3 kg)   22 (!) 341 lb 4.8 oz (154.8 kg)   22 (!) 351 lb 8 oz (159.4 kg)   22 (!) 355 lb 8 oz (161.3 kg)   22 (!) 365 lb (165.6 kg)           Patient is taking the following bariatric postoperative vitamins:  Bariatric multivitamin with iron  Liquid vitamin D unsure of dose  Calcium twice daily  No B12        2024    10:42 AM   Weight Loss Medication History Reviewed With Patient   Which weight loss medications are you currently taking on a regular basis? None   If you are not taking a weight loss medication that was prescribed to you, please indicate why: Other   Are you having any side effects from the weight loss medication that we have prescribed you? No           2024    10:42 AM   Changes and Difficulties   I have made the following changes to my diet since my last visit: Trying to kick some sugar out   With regards to my diet, I am still struggling with: Being hungry, snacking   I have made the following changes to my activity/exercise since my last visit: [employer]   With regards to my activity/exercise, I am still struggling with: Since my brother passed i have been struggling with that       ROS:    Cardiovascular  Palpitations:   PAF  HTN:    yes  Gastrointestinal  GERD:   no  Constipation:   no  N/V:   no  Liver Dz:   yes  Psychiatric  Moods  "Stable:  Overall  Anxiety:   Yes- can be significant  Bipolar:  no  H/O ETOH/Drug Abuse no  H/O eating disorder: no  Endocrine  PMH/FMH of MTC or MEN2:  no  Neurologic:  H/O seizures:   no  Headaches:  no  Memory Impairment:  no    H/O kidney stones:  no  Kidney disease:  no    LABS:  Reviewed in Epic    BP Readings from Last 6 Encounters:   11/28/22 127/76   08/29/22 124/78   06/08/22 130/84   06/02/22 131/85   05/27/22 126/75   03/08/22 122/88       Pulse Readings from Last 6 Encounters:   08/29/22 64   06/08/22 61   06/02/22 68   05/27/22 73   03/08/22 65   11/30/21 78       PE:  Ht 5' 8\" (1.727 m)   Wt 250 lb (113.4 kg)   BMI 38.01 kg/m    GENERAL: Healthy, alert and no distress  RESP: No audible wheeze, cough, or visible cyanosis.  No visible retractions or increased work of breathing.    SKIN: Visible skin clear. No significant rash, abnormal pigmentation or lesions.  PSYCH: Mentation appears normal, affect normal/bright, judgement and insight intact        "

## 2024-05-24 ENCOUNTER — VIRTUAL VISIT (OUTPATIENT)
Dept: SURGERY | Facility: CLINIC | Age: 49
End: 2024-05-24
Payer: COMMERCIAL

## 2024-05-24 VITALS — BODY MASS INDEX: 37.89 KG/M2 | WEIGHT: 250 LBS | HEIGHT: 68 IN

## 2024-05-24 DIAGNOSIS — R63.8 ABNORMAL CRAVING: ICD-10-CM

## 2024-05-24 DIAGNOSIS — E66.01 CLASS 2 SEVERE OBESITY DUE TO EXCESS CALORIES WITH SERIOUS COMORBIDITY AND BODY MASS INDEX (BMI) OF 38.0 TO 38.9 IN ADULT (H): ICD-10-CM

## 2024-05-24 DIAGNOSIS — Z98.84 BARIATRIC SURGERY STATUS: Primary | ICD-10-CM

## 2024-05-24 DIAGNOSIS — E66.812 CLASS 2 SEVERE OBESITY DUE TO EXCESS CALORIES WITH SERIOUS COMORBIDITY AND BODY MASS INDEX (BMI) OF 38.0 TO 38.9 IN ADULT (H): ICD-10-CM

## 2024-05-24 DIAGNOSIS — K91.2 POSTSURGICAL MALABSORPTION: ICD-10-CM

## 2024-05-24 PROCEDURE — 99215 OFFICE O/P EST HI 40 MIN: CPT | Mod: 95 | Performed by: PHYSICIAN ASSISTANT

## 2024-05-24 RX ORDER — NALTREXONE HYDROCHLORIDE 50 MG/1
TABLET, FILM COATED ORAL
Qty: 90 TABLET | Refills: 1 | Status: SHIPPED | OUTPATIENT
Start: 2024-05-24 | End: 2024-07-25 | Stop reason: SINTOL

## 2024-05-24 NOTE — ASSESSMENT & PLAN NOTE
Continue taking recommended post-op vitamins.  Double amount of daily vitamin D   Reviewed labs drawn in February.  See dietitian to review bariatric vitamins.  Recommend patient on bariatric vitamin that has at least 45 mg of iron in it

## 2024-05-24 NOTE — PATIENT INSTRUCTIONS
"Nice to talk with you today. Below is the plan discussed.-  MARSHALL Delaney      Pt Instructions:  Double dose of daily vitamin D    Start Naltrexone.   Directions: Take 1/2 tab 1-2 hours prior to biggest cravings or extra hunger for a week.  If tolerating, increase to 1/2 tablet twice daily.     Labs ordered.  Please call [phone] to set up a lab appt in 3 months    Follow up:    Call [phone] to schedule next visit in   May follow-up with dietitian now for annual visit and review of vitamins    Naltrexone    Naltrexone is a medication that is used most often to help people who are troubled by dependence on prescription pain killers or alcohol. It has also been found to help with weight loss. Although it's not currently FDA approved for weight loss, it has been used safely for a number of years to help people who are carrying extra weight.     Just how Naltrexone helps with weight loss has not been exactly determined.  It seems to work by quieting down brain signals related to strong food cravings. Many of our patients use the word \"addiction\" to describe their feelings and constant thoughts about food. It makes sense then to treat the feeling of dependence on food, outside of real hunger, with a medication designed to help with other sorts of dependence.     Our patients on Naltrexone find that they:    >feel less interest in food   >think less about food and eating and have more time to think of other things   >find it easier to push the plate away   >have an easier time eating less    For some of our patients, these feelings are very immediate. Other patients, don't feel much of a change but find they've lost weight. Like all weight loss medications, Naltrexone works best when you help it work. This means:  1. Having less tempting high calorie (fattening) food around the house or office. (For people with strong cravings this is very important.)   2. Staying away from situations or people that may trigger " your cravings .   3. Eating out only one time or less each week.  4. Eating your meals at a table with the TV or computer off.    Side-effects. Naltrexone is generally well tolerated. The main side-effect we see is  nausea or a woozy feeling. A small number of people feel quite ill. Most people have a mild reaction and some people have no reaction at all.  The good news is that this feeling does go away.     In order to avoid nausea, please start the medication with half a pill for the first few days. Go on to a full pill if you are feeling well.      If you  are nauseated on 1/2 a pill it is okay to cut back to 1/4 pill ( a very small amount). Take this for a couple of days and work your way back up to a 1/2 pill and then a whole pill. Taking the medication at night or with food  to start also may help prevent the feeling of nausea.       WARNING: This medication blocks the action of opioid type pain medications.    If you routinely take narcotic pain medication like Codeine, Oxycontin,Percocet,Morphine,Dilaudid or Methodone, do not take this until you have talked with weight management staff.   2.  If you are planning surgery you should stop Naltrexone 4 days prior to the surgery.   3.  If you have an injury that requires pain medication, make sure the health care staff knows you take Naltrexone.       For any questions/concerns contact [facility] [phone]

## 2024-06-30 ENCOUNTER — HEALTH MAINTENANCE LETTER (OUTPATIENT)
Age: 49
End: 2024-06-30

## 2024-07-12 ENCOUNTER — HOSPITAL ENCOUNTER (INPATIENT)
Facility: CLINIC | Age: 49
LOS: 2 days | Discharge: HOME | DRG: 378 | End: 2024-07-14
Attending: STUDENT IN AN ORGANIZED HEALTH CARE EDUCATION/TRAINING PROGRAM | Admitting: INTERNAL MEDICINE
Payer: COMMERCIAL

## 2024-07-12 DIAGNOSIS — K59.00 CONSTIPATION, UNSPECIFIED CONSTIPATION TYPE: Primary | ICD-10-CM

## 2024-07-12 DIAGNOSIS — K92.2 UPPER GI BLEED: ICD-10-CM

## 2024-07-12 LAB
ABO/RH(D): NORMAL
ANTIBODY SCREEN: NEGATIVE
BASOPHILS # BLD AUTO: 0 10E3/UL (ref 0–0.2)
BASOPHILS NFR BLD AUTO: 0 %
CA-I BLD-MCNC: 4.4 MG/DL (ref 4.4–5.2)
EOSINOPHIL # BLD AUTO: 0.1 10E3/UL (ref 0–0.7)
EOSINOPHIL NFR BLD AUTO: 2 %
ERYTHROCYTE [DISTWIDTH] IN BLOOD BY AUTOMATED COUNT: 13.3 % (ref 10–15)
HCT VFR BLD AUTO: 26 % (ref 40–53)
HGB BLD-MCNC: 8.8 G/DL (ref 13.3–17.7)
IMM GRANULOCYTES # BLD: 0 10E3/UL
IMM GRANULOCYTES NFR BLD: 0 %
LYMPHOCYTES # BLD AUTO: 2.7 10E3/UL (ref 0.8–5.3)
LYMPHOCYTES NFR BLD AUTO: 45 %
MAGNESIUM SERPL-MCNC: 2.1 MG/DL (ref 1.7–2.3)
MCH RBC QN AUTO: 29.4 PG (ref 26.5–33)
MCHC RBC AUTO-ENTMCNC: 33.8 G/DL (ref 31.5–36.5)
MCV RBC AUTO: 87 FL (ref 78–100)
MONOCYTES # BLD AUTO: 0.5 10E3/UL (ref 0–1.3)
MONOCYTES NFR BLD AUTO: 8 %
NEUTROPHILS # BLD AUTO: 2.6 10E3/UL (ref 1.6–8.3)
NEUTROPHILS NFR BLD AUTO: 44 %
NRBC # BLD AUTO: 0 10E3/UL
NRBC BLD AUTO-RTO: 0 /100
PLATELET # BLD AUTO: 232 10E3/UL (ref 150–450)
POTASSIUM SERPL-SCNC: 4 MMOL/L (ref 3.4–5.3)
RBC # BLD AUTO: 2.99 10E6/UL (ref 4.4–5.9)
SPECIMEN EXPIRATION DATE: NORMAL
WBC # BLD AUTO: 5.9 10E3/UL (ref 4–11)

## 2024-07-12 PROCEDURE — 82330 ASSAY OF CALCIUM: CPT | Performed by: INTERNAL MEDICINE

## 2024-07-12 PROCEDURE — 84132 ASSAY OF SERUM POTASSIUM: CPT | Performed by: INTERNAL MEDICINE

## 2024-07-12 PROCEDURE — 99222 1ST HOSP IP/OBS MODERATE 55: CPT | Performed by: INTERNAL MEDICINE

## 2024-07-12 PROCEDURE — 86923 COMPATIBILITY TEST ELECTRIC: CPT | Performed by: INTERNAL MEDICINE

## 2024-07-12 PROCEDURE — 85025 COMPLETE CBC W/AUTO DIFF WBC: CPT | Performed by: INTERNAL MEDICINE

## 2024-07-12 PROCEDURE — 82306 VITAMIN D 25 HYDROXY: CPT | Performed by: INTERNAL MEDICINE

## 2024-07-12 PROCEDURE — 120N000001 HC R&B MED SURG/OB

## 2024-07-12 PROCEDURE — 250N000013 HC RX MED GY IP 250 OP 250 PS 637: Performed by: INTERNAL MEDICINE

## 2024-07-12 PROCEDURE — 250N000011 HC RX IP 250 OP 636: Performed by: INTERNAL MEDICINE

## 2024-07-12 PROCEDURE — 86900 BLOOD TYPING SEROLOGIC ABO: CPT | Performed by: INTERNAL MEDICINE

## 2024-07-12 PROCEDURE — 83735 ASSAY OF MAGNESIUM: CPT | Performed by: STUDENT IN AN ORGANIZED HEALTH CARE EDUCATION/TRAINING PROGRAM

## 2024-07-12 PROCEDURE — 36415 COLL VENOUS BLD VENIPUNCTURE: CPT | Performed by: INTERNAL MEDICINE

## 2024-07-12 RX ORDER — ACETAMINOPHEN 500 MG
500-1000 TABLET ORAL EVERY 6 HOURS PRN
COMMUNITY

## 2024-07-12 RX ORDER — ALPRAZOLAM 1 MG
.5-1 TABLET ORAL EVERY 6 HOURS PRN
COMMUNITY

## 2024-07-12 RX ORDER — CALCIUM CARBONATE 500 MG/1
1000 TABLET, CHEWABLE ORAL 4 TIMES DAILY PRN
Status: DISCONTINUED | OUTPATIENT
Start: 2024-07-12 | End: 2024-07-14 | Stop reason: HOSPADM

## 2024-07-12 RX ORDER — LIDOCAINE 40 MG/G
CREAM TOPICAL
Status: DISCONTINUED | OUTPATIENT
Start: 2024-07-12 | End: 2024-07-14 | Stop reason: HOSPADM

## 2024-07-12 RX ORDER — ACETAMINOPHEN 650 MG/1
650 SUPPOSITORY RECTAL EVERY 4 HOURS PRN
Status: DISCONTINUED | OUTPATIENT
Start: 2024-07-12 | End: 2024-07-14 | Stop reason: HOSPADM

## 2024-07-12 RX ORDER — PROCHLORPERAZINE 25 MG
25 SUPPOSITORY, RECTAL RECTAL EVERY 12 HOURS PRN
Status: DISCONTINUED | OUTPATIENT
Start: 2024-07-12 | End: 2024-07-14 | Stop reason: HOSPADM

## 2024-07-12 RX ORDER — PROCHLORPERAZINE MALEATE 10 MG
10 TABLET ORAL EVERY 6 HOURS PRN
Status: DISCONTINUED | OUTPATIENT
Start: 2024-07-12 | End: 2024-07-14 | Stop reason: HOSPADM

## 2024-07-12 RX ORDER — AMOXICILLIN 250 MG
1 CAPSULE ORAL 2 TIMES DAILY PRN
Status: DISCONTINUED | OUTPATIENT
Start: 2024-07-12 | End: 2024-07-14 | Stop reason: HOSPADM

## 2024-07-12 RX ORDER — FLUTICASONE FUROATE AND VILANTEROL 200; 25 UG/1; UG/1
1 POWDER RESPIRATORY (INHALATION) DAILY
Status: ON HOLD | COMMUNITY
End: 2024-07-12

## 2024-07-12 RX ORDER — POLYETHYLENE GLYCOL 3350 17 G/17G
17 POWDER, FOR SOLUTION ORAL 2 TIMES DAILY PRN
Status: DISCONTINUED | OUTPATIENT
Start: 2024-07-12 | End: 2024-07-14 | Stop reason: HOSPADM

## 2024-07-12 RX ORDER — ONDANSETRON 4 MG/1
4 TABLET, ORALLY DISINTEGRATING ORAL EVERY 6 HOURS PRN
Status: DISCONTINUED | OUTPATIENT
Start: 2024-07-12 | End: 2024-07-14 | Stop reason: HOSPADM

## 2024-07-12 RX ORDER — SODIUM CHLORIDE AND POTASSIUM CHLORIDE 150; 900 MG/100ML; MG/100ML
INJECTION, SOLUTION INTRAVENOUS CONTINUOUS
Status: DISCONTINUED | OUTPATIENT
Start: 2024-07-12 | End: 2024-07-14 | Stop reason: HOSPADM

## 2024-07-12 RX ORDER — ONDANSETRON 2 MG/ML
4 INJECTION INTRAMUSCULAR; INTRAVENOUS EVERY 6 HOURS PRN
Status: DISCONTINUED | OUTPATIENT
Start: 2024-07-12 | End: 2024-07-14 | Stop reason: HOSPADM

## 2024-07-12 RX ORDER — MULTIPLE VITAMINS W/ MINERALS TAB 9MG-400MCG
1 TAB ORAL DAILY
COMMUNITY

## 2024-07-12 RX ORDER — LIDOCAINE 40 MG/G
CREAM TOPICAL
OUTPATIENT
Start: 2024-07-12

## 2024-07-12 RX ORDER — ACETAMINOPHEN 325 MG/1
650 TABLET ORAL EVERY 4 HOURS PRN
Status: DISCONTINUED | OUTPATIENT
Start: 2024-07-12 | End: 2024-07-14 | Stop reason: HOSPADM

## 2024-07-12 RX ORDER — AMOXICILLIN 250 MG
2 CAPSULE ORAL 2 TIMES DAILY PRN
Status: DISCONTINUED | OUTPATIENT
Start: 2024-07-12 | End: 2024-07-14 | Stop reason: HOSPADM

## 2024-07-12 RX ORDER — PAROXETINE 30 MG/1
30 TABLET, FILM COATED ORAL DAILY
COMMUNITY

## 2024-07-12 RX ADMIN — ACETAMINOPHEN 650 MG: 325 TABLET, FILM COATED ORAL at 23:33

## 2024-07-12 RX ADMIN — PANTOPRAZOLE SODIUM 40 MG: 40 INJECTION, POWDER, FOR SOLUTION INTRAVENOUS at 21:58

## 2024-07-12 RX ADMIN — POTASSIUM CHLORIDE AND SODIUM CHLORIDE: 900; 150 INJECTION, SOLUTION INTRAVENOUS at 22:11

## 2024-07-12 ASSESSMENT — ACTIVITIES OF DAILY LIVING (ADL)
ADLS_ACUITY_SCORE: 20

## 2024-07-12 NOTE — PROGRESS NOTES
Transfer Type: [facility]  Transfer Triage Note    Date of call: 07/12/24  Time of call: 12:27 PM    Current Patient Location:  [facility] > wanting transfer to [facility]  Current Level of Care: ED    Vitals: 99 HR 80 135/88 RR 13 99% on RA   Diagnosis: GI Bleed  Reason for requested transfer: Patient has established care here   Isolation Needs: None    Care everywhere has been updated and reviewed: No  Necessary images have been sent through PACS: No    If patient is transferring for specialty care or specific procedure, the specialist required has participated in the transfer call and agreed with need for transfer and anticipated timeline: Yes, Provider name: Dr Hernandez (Gen Surg) and Dr. Kovacs (GI) specialty with: as prior    Dr. Hernandez Gen/Surg [facility] on the line for patient w/ hx of bariatric surgery and GI Dr. Kovacs at [facility] on the line as well.  48 yo male, pmhx of s/p gastric bypass (5/2022), now admitted at [facility] for concerns of GI bleed. Pt worked night shift last night, had an episode of dark black stool and also vomiting episodes w/ blood. Hgb 9.8, last known normal Hgb 14 in Feb 2024. Bun at 25. Currently on IV Protonix, zofran as needed. No further episodes of vomiting in ED.   - keep NPO  - accepted for med/surg w/ tele    Transfer accepted: Yes  Stability of Patient: Patient is vitally stable, with no critical labs, and will likely remain stable throughout the transfer process  Is the patient appropriate for [facility]? No, Accepted to [facility]. Bariatric Surgery done w/ [facility]  Level of Care Needed: Med Surg  Telemetry Needed:  Med (Remote) Telemetry  Expected Time of Arrival for Transfer: 8-24 hours  Arrival Location:  [facility]     Recommendations for Management and Stabilization: Not needed        Shelley Clay MD

## 2024-07-13 LAB
ANION GAP SERPL CALCULATED.3IONS-SCNC: 3 MMOL/L (ref 7–15)
BLD PROD TYP BPU: NORMAL
BLOOD COMPONENT TYPE: NORMAL
BUN SERPL-MCNC: 16.2 MG/DL (ref 6–20)
CALCIUM SERPL-MCNC: 7.4 MG/DL (ref 8.6–10)
CHLORIDE SERPL-SCNC: 111 MMOL/L (ref 98–107)
CODING SYSTEM: NORMAL
CREAT SERPL-MCNC: 0.6 MG/DL (ref 0.67–1.17)
CROSSMATCH: NORMAL
DEPRECATED HCO3 PLAS-SCNC: 26 MMOL/L (ref 22–29)
EGFRCR SERPLBLD CKD-EPI 2021: >90 ML/MIN/1.73M2
ERYTHROCYTE [DISTWIDTH] IN BLOOD BY AUTOMATED COUNT: 13.5 % (ref 10–15)
GLUCOSE SERPL-MCNC: 90 MG/DL (ref 70–99)
HCT VFR BLD AUTO: 24.7 % (ref 40–53)
HEMOCCULT STL QL: POSITIVE
HGB BLD-MCNC: 7.7 G/DL (ref 13.3–17.7)
HGB BLD-MCNC: 7.8 G/DL (ref 13.3–17.7)
HGB BLD-MCNC: 8 G/DL (ref 13.3–17.7)
HOLD SPECIMEN: NORMAL
IRON BINDING CAPACITY (ROCHE): 259 UG/DL (ref 240–430)
IRON SATN MFR SERPL: 25 % (ref 15–46)
IRON SERPL-MCNC: 65 UG/DL (ref 61–157)
ISSUE DATE AND TIME: NORMAL
MAGNESIUM SERPL-MCNC: 2 MG/DL (ref 1.7–2.3)
MCH RBC QN AUTO: 28.2 PG (ref 26.5–33)
MCHC RBC AUTO-ENTMCNC: 32.4 G/DL (ref 31.5–36.5)
MCV RBC AUTO: 87 FL (ref 78–100)
PLATELET # BLD AUTO: 192 10E3/UL (ref 150–450)
POTASSIUM SERPL-SCNC: 3.8 MMOL/L (ref 3.4–5.3)
POTASSIUM SERPL-SCNC: 4 MMOL/L (ref 3.4–5.3)
RBC # BLD AUTO: 2.84 10E6/UL (ref 4.4–5.9)
SODIUM SERPL-SCNC: 140 MMOL/L (ref 135–145)
UNIT ABO/RH: NORMAL
UNIT NUMBER: NORMAL
UNIT STATUS: NORMAL
UNIT TYPE ISBT: 7300
VIT D+METAB SERPL-MCNC: 31 NG/ML (ref 20–50)
WBC # BLD AUTO: 4.7 10E3/UL (ref 4–11)

## 2024-07-13 PROCEDURE — 99222 1ST HOSP IP/OBS MODERATE 55: CPT | Performed by: SURGERY

## 2024-07-13 PROCEDURE — 85018 HEMOGLOBIN: CPT | Performed by: INTERNAL MEDICINE

## 2024-07-13 PROCEDURE — 36415 COLL VENOUS BLD VENIPUNCTURE: CPT | Performed by: INTERNAL MEDICINE

## 2024-07-13 PROCEDURE — 84132 ASSAY OF SERUM POTASSIUM: CPT | Performed by: STUDENT IN AN ORGANIZED HEALTH CARE EDUCATION/TRAINING PROGRAM

## 2024-07-13 PROCEDURE — 85049 AUTOMATED PLATELET COUNT: CPT | Performed by: INTERNAL MEDICINE

## 2024-07-13 PROCEDURE — 250N000011 HC RX IP 250 OP 636: Performed by: INTERNAL MEDICINE

## 2024-07-13 PROCEDURE — 83735 ASSAY OF MAGNESIUM: CPT | Performed by: STUDENT IN AN ORGANIZED HEALTH CARE EDUCATION/TRAINING PROGRAM

## 2024-07-13 PROCEDURE — 272N000104 HC DEVICE CLIP RESOLUTION, EACH: Performed by: INTERNAL MEDICINE

## 2024-07-13 PROCEDURE — 36415 COLL VENOUS BLD VENIPUNCTURE: CPT | Performed by: STUDENT IN AN ORGANIZED HEALTH CARE EDUCATION/TRAINING PROGRAM

## 2024-07-13 PROCEDURE — 83550 IRON BINDING TEST: CPT | Performed by: INTERNAL MEDICINE

## 2024-07-13 PROCEDURE — 120N000001 HC R&B MED SURG/OB

## 2024-07-13 PROCEDURE — 43255 EGD CONTROL BLEEDING ANY: CPT | Performed by: INTERNAL MEDICINE

## 2024-07-13 PROCEDURE — 999N000127 HC STATISTIC PERIPHERAL IV START W US GUIDANCE

## 2024-07-13 PROCEDURE — P9016 RBC LEUKOCYTES REDUCED: HCPCS | Performed by: INTERNAL MEDICINE

## 2024-07-13 PROCEDURE — 250N000013 HC RX MED GY IP 250 OP 250 PS 637: Performed by: INTERNAL MEDICINE

## 2024-07-13 PROCEDURE — 99233 SBSQ HOSP IP/OBS HIGH 50: CPT | Performed by: INTERNAL MEDICINE

## 2024-07-13 PROCEDURE — 999N000099 HC STATISTIC MODERATE SEDATION < 10 MIN: Performed by: INTERNAL MEDICINE

## 2024-07-13 PROCEDURE — 250N000009 HC RX 250: Performed by: INTERNAL MEDICINE

## 2024-07-13 PROCEDURE — 82272 OCCULT BLD FECES 1-3 TESTS: CPT | Performed by: INTERNAL MEDICINE

## 2024-07-13 PROCEDURE — 80048 BASIC METABOLIC PNL TOTAL CA: CPT | Performed by: INTERNAL MEDICINE

## 2024-07-13 RX ORDER — METOPROLOL TARTRATE 50 MG
50 TABLET ORAL 2 TIMES DAILY
Status: DISCONTINUED | OUTPATIENT
Start: 2024-07-13 | End: 2024-07-14 | Stop reason: HOSPADM

## 2024-07-13 RX ORDER — ALPRAZOLAM 0.25 MG
.5-1 TABLET ORAL EVERY 6 HOURS PRN
Status: DISCONTINUED | OUTPATIENT
Start: 2024-07-13 | End: 2024-07-14 | Stop reason: HOSPADM

## 2024-07-13 RX ORDER — ALBUTEROL SULFATE 90 UG/1
2 AEROSOL, METERED RESPIRATORY (INHALATION) EVERY 4 HOURS PRN
Status: DISCONTINUED | OUTPATIENT
Start: 2024-07-13 | End: 2024-07-14 | Stop reason: HOSPADM

## 2024-07-13 RX ORDER — FENTANYL CITRATE 50 UG/ML
INJECTION, SOLUTION INTRAMUSCULAR; INTRAVENOUS PRN
Status: DISCONTINUED | OUTPATIENT
Start: 2024-07-13 | End: 2024-07-13 | Stop reason: HOSPADM

## 2024-07-13 RX ORDER — PAROXETINE 30 MG/1
30 TABLET, FILM COATED ORAL DAILY
Status: DISCONTINUED | OUTPATIENT
Start: 2024-07-13 | End: 2024-07-14 | Stop reason: HOSPADM

## 2024-07-13 RX ADMIN — ACETAMINOPHEN 650 MG: 325 TABLET, FILM COATED ORAL at 09:28

## 2024-07-13 RX ADMIN — PANTOPRAZOLE SODIUM 40 MG: 40 INJECTION, POWDER, FOR SOLUTION INTRAVENOUS at 21:10

## 2024-07-13 RX ADMIN — POTASSIUM CHLORIDE AND SODIUM CHLORIDE: 900; 150 INJECTION, SOLUTION INTRAVENOUS at 17:00

## 2024-07-13 RX ADMIN — PANTOPRAZOLE SODIUM 40 MG: 40 INJECTION, POWDER, FOR SOLUTION INTRAVENOUS at 09:28

## 2024-07-13 RX ADMIN — PAROXETINE HYDROCHLORIDE 30 MG: 30 TABLET, FILM COATED ORAL at 09:28

## 2024-07-13 ASSESSMENT — ACTIVITIES OF DAILY LIVING (ADL)
ADLS_ACUITY_SCORE: 22
ADLS_ACUITY_SCORE: 20
ADLS_ACUITY_SCORE: 22
ADLS_ACUITY_SCORE: 20
ADLS_ACUITY_SCORE: 22
ADLS_ACUITY_SCORE: 20
ADLS_ACUITY_SCORE: 22
ADLS_ACUITY_SCORE: 20

## 2024-07-13 NOTE — PROVIDER NOTIFICATION
MD Notification    Notified Person: MD Flannery     Notified Person Name:    Notification Date/Time:    Notification Interaction:    Purpose of Notification:    Orders Received:833-2 here TM arrived from [facility]. need admit orders,    Comments:

## 2024-07-13 NOTE — PROGRESS NOTES
[facility]    Hospitalist Progress Note    Date of Service (when I saw the patient): 07/13/2024    Assessment & Plan   Bethel Woods is a pleasant 49 year old gentleman s/p laparoscopic gastric bypass 5/26/2022, atrial fibrillation s/p ablation who was admitted 7/12/2024 after transfer from [facility] for further evaluation of hematemesis and maroon loose stools starting the morning of 7/12.   ED staff reviewed with Dr. Hernandez of General Surgery [facility] ( re. hx of bariatric surgery) and with Dr. Kovacs of GI.  Has been hemodynamically stable since transfer.  Current problems include:     Acute blood loss anemia from upper GI bleed  S/p laparoscopic gastric bypass 5/2022  Normocytic anemia; Hgb decreased to 7.8 earlier today, now up to 8.0  - transfused 1 U PRBC early this morning  - suspect bleeding due to ulcer  - Bethel reported excessive drinking several days before admission, but did not have any nausea or vomiting  - has not been using NSAIDs.  No known liver disease  - no further hematemesis since presentation.  1 dark BM earlier this morning.  - was NPO and on IVF normal saline +20 KCl at 100 cc/h until this afternoon, after EGD was done  - begin regular diet per GI  - continue Protonix 40 IV twice daily  - change Hgb checks to Q 8 hours, and then Q 12 hours  - check stool hemoccult  - telemetry   - anticipate possible EGD today; await follow up by [facility]  - check iron studies  - repeat Hgb 7/14; if still < 8.0 will likely transfuse again    Etoh use  - review w/ Bethel  - monitor for any signs of w/d     Hypocalcemia at outside hospital  - ionized calcium normal     Symptomatic paroxysmal atrial fibrillation, s/p successful ablation 2021; no recurrence  - holding metoprolol in the context of GI bleed     Generalized anxiety disorder  - continue PTA paroxetine and alprazolam as needed     Mild intermittent asthma  - no evidence of respiratory distress  - continue PTA  albuterol inhaler as needed            Diet: Combination Diet Regular Diet Adult    DVT Prophylaxis: Pneumatic Compression Devices  Oscar Catheter: Not present  Lines: None     Cardiac Monitoring: ACTIVE order. Indication: GI bleeding  Code Status: Full Code          Clinically Significant Risk Factors Present on Admission      Asthma: noted on problem list      Disposition Plan     Medically Ready for Discharge: anticipated in 1-2 days    Disposition: Expected discharge in 1-2 days.      JERSON Garrido MD, [facility] Hospitalist  Text Page (7am - 6pm)    Interval History   No new issues today; reviewed w/ RN.  Appetite returning; tolerating clear liquids this afternoon.  Had EGD earlier.  Had 1 further dark/burgundy BM early this a.m.    Data reviewed today: I reviewed all new labs and imaging results over the last 24 hours.     Physical Exam   Temp: 98.7  F (37.1  C) Temp src: Oral BP: 123/73 Pulse: 76   Resp: 16 SpO2: 100 % O2 Device: None (Room air)    Vitals:    07/12/24 2209   Weight: 119.9 kg (264 lb 5.3 oz)     Vital Signs with Ranges  Temp:  [98.1  F (36.7  C)-98.7  F (37.1  C)] 98.7  F (37.1  C)  Pulse:  [73-81] 76  Resp:  [15-16] 16  BP: (115-131)/(67-77) 123/73  SpO2:  [99 %-100 %] 100 %  No intake/output data recorded.    Constitutional: awake, no apparent distress; lying in bed  HEENT: sclerae clear; MM's moist  Respiratory: good a/e bilaterally, no wheezing or rhonchi  Cardiovascular: regular rate and rhythm, S1, S2 noted; no m/r/g  GI: abdomen obese;  + bowel sounds; soft, non-tender, non-distended  Skin/Integumen: no rashes, no cyanosis, no jaundice  Musculoskeletal: no edema  Neurologic: follows directions well; no focal deficits      Medications   Current Facility-Administered Medications   Medication Dose Route Frequency Provider Last Rate Last Admin    0.9% sodium chloride + KCl 20 mEq/L infusion   Intravenous Continuous Soraya Hernandez  mL/hr at 07/12/24 2211 New Bag at  07/12/24 2211     Current Facility-Administered Medications   Medication Dose Route Frequency Provider Last Rate Last Admin    [Held by provider] metoprolol tartrate (LOPRESSOR) tablet 50 mg  50 mg Oral BID Soraya Hernandez MD        pantoprazole (PROTONIX) IV push injection 40 mg  40 mg Intravenous BID Soraya Hernandez MD   40 mg at 07/12/24 2158    PARoxetine (PAXIL) tablet 30 mg  30 mg Oral Daily Soraya Hernandez MD        sodium chloride (PF) 0.9% PF flush 3 mL  3 mL Intracatheter Q8H Soraya Hernandez MD   3 mL at 07/13/24 0307       Data   Recent Labs   Lab 07/13/24  0218 07/12/24  2108   WBC  --  5.9   HGB 7.8* 8.8*   MCV  --  87   PLT  --  232     --    POTASSIUM 4.0 4.0   CHLORIDE 111*  --    CO2 26  --    BUN 16.2  --    CR 0.60*  --    ANIONGAP 3*  --    SALLY 7.4*  --    GLC 90  --

## 2024-07-13 NOTE — PROVIDER NOTIFICATION
MD Notification    Notified Person: MD Garrido    Notified Person Name:    Notification Date/Time:  7/13/2024 9:57 AM      Notification Interaction:    Purpose of Notification:hgb 8.0, order is to transfuse if 8 or less. 1 soft black stool during noc. not symptomatic. just wanted to confirm you want me to transfuse.     Orders Received: MD will review and decide plan    Comments:

## 2024-07-13 NOTE — CONSULTS
[facility]    General Surgery Consultation    Date of Admission:  7/12/2024  Date of Consult (When I saw the patient): 07/13/24    Assessment & Plan   Bethel Woods is a 49 year old male who was admitted on 7/12/2024. I was asked to see the patient for GI bleed.  Patient has a history of a laparoscopic Donnie-en-Y gastric bypass approximately 2 years ago.  Has not had any issues but had 2 large bloody stools overnight as well as hematemesis.  Hemoglobin was low on presentation but he seems to have stabilized with a single blood transfusion.  Plan will be for upper endoscopy and possible endoscopic management.  No plans for surgery at this time.    Active Problems:    Upper GI bleed      Roberto Peacock MD, MD    Code Status    Full Code    Reason for Consult   Reason for consult: Upper GI bleed    Primary Care Physician   Rubén Austin    Chief Complaint   Hematemesis    History of Present Illness   Bethel Woods is a 49 year old male who presents with GI bleed.  Patient has a history of a laparoscopic Donnie-en-Y gastric bypass 2 years ago.  He had not had any issues with obstruction, ulcerations.  Has not been taking proton pump inhibitors.  Yesterday he had a stool after work which was largely blood.  Had another bloody stool couple of hours later.  Following this he began having some emesis which he had not had since before his surgery.  This again was mostly blood and he decided to call an ambulance.  He presented to the emergency department where he was found to have mild anemia at 7.8.  Hemodynamically stable.  Got a single unit of blood and has been stable at 8.  We are asked to help evaluate.    Past Medical History   I have reviewed this patient's medical history and updated it with pertinent information if needed.   Past Medical History:   Diagnosis Date    Asthma     Hypertension     Irregular heart beat 06/11/2021    Atrial fibrillation S/P Ablation    Obese     Panic attack      Prediabetes        Past Surgical History   I have reviewed this patient's surgical history and updated it with pertinent information if needed.  Past Surgical History:   Procedure Laterality Date    HC OR CATH ABLATION NON-CARDIAC ENDOVASCULAR      LAPAROSCOPIC BYPASS GASTRIC N/A 5/26/2022    Procedure: LAPAROSCOPIC GASTRIC BYPASS,;  Surgeon: Randy Mesa MD;  Location: [facility] OR    VASECTOMY         Prior to Admission Medications   Prior to Admission Medications   Prescriptions Last Dose Informant Patient Reported? Taking?   ALPRAZolam (XANAX) 1 MG tablet Unknown  Yes Yes   Sig: Take 0.5-1 mg by mouth every 6 hours as needed for anxiety   NONFORMULARY Past Week  Yes Yes   Sig: Take by mouth daily Vitamin D solution. 1 dropperful. Unsure of strength or dose   PARoxetine (PAXIL) 30 MG tablet Past Week  Yes Yes   Sig: Take 30 mg by mouth daily   acetaminophen (TYLENOL) 500 MG tablet 7/11/2024 at hs 1500mg  Yes Yes   Sig: Take 500-1,000 mg by mouth every 6 hours as needed for mild pain Maximum of 1000mg each dose   albuterol (PROAIR HFA/PROVENTIL HFA/VENTOLIN HFA) 108 (90 Base) MCG/ACT inhaler Unknown  Yes Yes   Sig: Inhale 2 puffs into the lungs every 4 hours as needed for shortness of breath / dyspnea or wheezing   metoprolol tartrate (LOPRESSOR) 50 MG tablet Past Week  Yes Yes   Sig: Take 50 mg by mouth 2 times daily   multivitamin w/minerals (THERA-VIT-M) tablet Past Week  Yes Yes   Sig: Take 1 tablet by mouth daily   naltrexone (DEPADE/REVIA) 50 MG tablet Not Taking  No No   Sig: Take 1/2 tablet 1-2 hours before biggest craving time for 7 days, then increase to 1/2 tablet twice daily.   Patient not taking: Reported on 7/12/2024   topiramate (TOPAMAX) 25 MG tablet Not Taking  No No   Sig: Take 25 mg daily week 1, increase to 50 mg daily week 2, then increase to 75 mg daily week 3 and beyond   Patient not taking: Reported on 7/12/2024      Facility-Administered Medications: None     Allergies   Allergies    Allergen Reactions    Asa [Aspirin]      Gastric Bypass    Nsaids      Gastric Bypass    Phentermine      Elevated Blood pressure and panic attacks    Sertraline      Felt like I was going to pass out.       Social History   I have reviewed this patient's social history and updated it with pertinent information if needed. Bethel Woods  reports that he has never smoked. He has never used smokeless tobacco. He reports that he does not currently use alcohol. He reports that he does not use drugs.    Family History   Family history reviewed with patient and is noncontributory.    Review of Systems   The 10 point Review of Systems is negative other than noted in the HPI or here.  Currently no nausea, emesis, or abdominal pain.    Physical Exam   Temp: 98.7  F (37.1  C) Temp src: Oral BP: 123/73 Pulse: 76   Resp: 16 SpO2: 100 % O2 Device: None (Room air)    Vital Signs with Ranges  Temp:  [98.1  F (36.7  C)-98.7  F (37.1  C)] 98.7  F (37.1  C)  Pulse:  [73-81] 76  Resp:  [15-16] 16  BP: (115-131)/(67-77) 123/73  SpO2:  [99 %-100 %] 100 %  264 lbs 5.3 oz    Pleasant overweight gentleman in no distress.  Patient has a pleasant affect and communicates well.   Pupils equal round and reactive to light.   No cervical lymphadenopathy or thyromegaly.   Lung fields clear, breathing comfortably.   Heart normal sinus rhythm.  No murmurs rubs or gallops.  Abdomen soft, nontender, nondistended.  Scars consistent with surgical history.  No significant abdominal pain.  Skin warm, dry.  No obvious rashes or lesions.      Data   Imaging studies and admission labs reviewed

## 2024-07-13 NOTE — PHARMACY-ADMISSION MEDICATION HISTORY
Pharmacist Admission Medication History    Admission medication history is complete. The information provided in this note is only as accurate as the sources available at the time of the update.    Information Source(s): Patient and CareEverywhere/SureScripts via in-person    Pertinent Information: Patient has missed doses of his home meds.   Pt says he does not use Breo-Ellipta inhaler. Patient said he took APAP 1500mg last night. I advised him that the max single dose is 1000mg.     Changes made to PTA medication list:  Added: apap, mvi  Deleted: calcium, xopenex, advair, vit c  Changed: paxil dose pe pt, xanax dose, vit d form. Marked as not taking: topamax, naltrexone    Allergies reviewed with patient and updates made in EHR: yes    Medication History Completed By: Karen Burkett RPH 7/12/2024 7:18 PM    PTA Med List   Medication Sig Last Dose    acetaminophen (TYLENOL) 500 MG tablet Take 500-1,000 mg by mouth every 6 hours as needed for mild pain Maximum of 1000mg each dose 7/11/2024 at hs 1500mg    albuterol (PROAIR HFA/PROVENTIL HFA/VENTOLIN HFA) 108 (90 Base) MCG/ACT inhaler Inhale 2 puffs into the lungs every 4 hours as needed for shortness of breath / dyspnea or wheezing Unknown    ALPRAZolam (XANAX) 1 MG tablet Take 0.5-1 mg by mouth every 6 hours as needed for anxiety Unknown    metoprolol tartrate (LOPRESSOR) 50 MG tablet Take 50 mg by mouth 2 times daily Past Week    multivitamin w/minerals (THERA-VIT-M) tablet Take 1 tablet by mouth daily Past Week    NONFORMULARY Take by mouth daily Vitamin D solution. 1 dropperful. Unsure of strength or dose Past Week    PARoxetine (PAXIL) 30 MG tablet Take 30 mg by mouth daily Past Week

## 2024-07-13 NOTE — H&P
[facility]    History and Physical - Hospitalist Service       Date of Admission:  7/12/2024    Assessment & Plan      Bethel Woods is a 49 year old male s/p 5/26/2022 laparoscopic gastric bypass, atrial fibrillation, s/p ablation who admitted on 7/12/2024 as transfer from [facility] after presenting with acute onset of hematemesis and maroon loose stools starting this morning.     Dr. Hernandez Gen/Surg [facility] on the line for patient w/ hx of bariatric surgery and GI Dr. Kovacs at [facility] consulted from outside hospital before transfer.    At presentation to OSH and here he is hemodynamically stable.   Hemoglobin 9.8 (14 in 2/2024, BUN 25.  , INR 1.1  , K3.7, BUN 25, CR 0.75    Acute blood loss anemia from upper GI bleed  S/p laparoscopic gastric bypass 5/2022  Suspect bleeding secondary to ulcer.  Patient admits to excessive drinking several days before but denies any nausea or vomiting during this episode and no vomiting proceeding hematemesis.  He has not been using NSAIDs.  No known liver disease  Currently hemodynamically stable, hemoglobin 8.8 at arrival.  (No PRBC given at OSH).   No further hematemesis since presentation.  1 loose black stool this afternoon.   N.p.o. normal saline +20 KCl at 100 cc/h  Continue Protonix 40 IV twice daily  Hemoglobin every 4 hours x 2 overnight ordered.   Transfusion consent signed and conditional orders for transfusion for hemoglobin less than 8  Telemetry   Notified Dr. Kovacs and both Dr. Kovacs and general surgery are consulted  Plan for EGD tomorrow AM.     Hypocalcemia at outside hospital, ionized calcium normal    Symptomatic paroxysmal atrial fibrillation, s/p successful ablation 2021- without recurrence  Holding metoprolol in the context of GI bleed    Generalized anxiety disorder  Continue PTA paroxetine and alprazolam as needed    Mild intermittent asthma  *No evidence of respiratory distress.  Lungs are  clear  Continue PTA albuterol inhaler as needed           Diet: Combination Diet Regular Diet Adult    DVT Prophylaxis: Pneumatic Compression Devices  Oscar Catheter: Not present  Lines: None     Cardiac Monitoring: ACTIVE order. Indication: GI bleeding  Code Status: Full Code      Clinically Significant Risk Factors Present on Admission                  # Hypertension: Noted on problem list                 # Asthma: noted on problem list        Disposition Plan     Medically Ready for Discharge: Anticipated in 2-4 Days           Soraya Hernandez MD  Hospitalist Service  [facility]  Securely message with [employer] (more info)  Text page via [employer] Paging/Directory     ______________________________________________________________________    Chief Complaint   Vomiting up dark blood.     History is obtained from the patient    History of Present Illness   The patient, Bethel, presents with a recent history of vomiting dark blood and passing dark blood in his stool. His symptoms began at 6:30 AM on the day of the visit.  After arriving home from work he was initially taken to the ED in [place], where he received Reglan and Bismuth subsalicylate, which alleviated his symptoms. He has not vomited blood since 9:30 AM when the ambulance transported him.    Bethel reports feeling slightly lightheaded but denies experiencing fevers, chills, shortness of breath, bruises, or rashes. He has no personal history of liver disease.  He denies smoking. He consumes alcohol approximately three times a month.  He does admit to drinking way too much on Monday but this was not usual for him.  He thinks he passed out.  He does not remember having any nausea or vomiting.  He is not taking ibuprofen or Aleve but uses Tylenol as needed.    Bethel has a history of gastric bypass surgery and has not undergone a stomach scope since the procedure. He has no prior history of stomach ulcers or bleeding. In May, he underwent a  colonoscopy, which revealed one benign polyp.     Additionally, he has a history of high blood pressure, which has improved since his surgery, and is currently taking metoprolol and Paxil for anxiety. He also has a history of atrial fibrillation and underwent an ablation in .    Social history  He works as a , usually at night.      Family History  Bethel reports a family history of heart issues, including his grandpa with heart disease, his father who  at 62 from a heart attack and had heart disease before 62, and his brother who had heart problems possibly related to drug use and  at 52. There is no reported family history of stomach issues.        Past Medical History    Past Medical History:   Diagnosis Date    Asthma     Hypertension     Irregular heart beat 2021    Atrial fibrillation S/P Ablation    Obese     Panic attack     Prediabetes        Past Surgical History   Past Surgical History:   Procedure Laterality Date    HC OR CATH ABLATION NON-CARDIAC ENDOVASCULAR      LAPAROSCOPIC BYPASS GASTRIC N/A 2022    Procedure: LAPAROSCOPIC GASTRIC BYPASS,;  Surgeon: Randy Mesa MD;  Location: [facility] OR    VASECTOMY         Prior to Admission Medications   Prior to Admission Medications   Prescriptions Last Dose Informant Patient Reported? Taking?   ALPRAZolam (XANAX) 1 MG tablet Unknown  Yes Yes   Sig: Take 0.5-1 mg by mouth every 6 hours as needed for anxiety   NONFORMULARY Past Week  Yes Yes   Sig: Take by mouth daily Vitamin D solution. 1 dropperful. Unsure of strength or dose   PARoxetine (PAXIL) 30 MG tablet Past Week  Yes Yes   Sig: Take 30 mg by mouth daily   acetaminophen (TYLENOL) 500 MG tablet 2024 at hs 1500mg  Yes Yes   Sig: Take 500-1,000 mg by mouth every 6 hours as needed for mild pain Maximum of 1000mg each dose   albuterol (PROAIR HFA/PROVENTIL HFA/VENTOLIN HFA) 108 (90 Base) MCG/ACT inhaler Unknown  Yes Yes   Sig: Inhale 2 puffs into the lungs every 4 hours  as needed for shortness of breath / dyspnea or wheezing   metoprolol tartrate (LOPRESSOR) 50 MG tablet Past Week  Yes Yes   Sig: Take 50 mg by mouth 2 times daily   multivitamin w/minerals (THERA-VIT-M) tablet Past Week  Yes Yes   Sig: Take 1 tablet by mouth daily   naltrexone (DEPADE/REVIA) 50 MG tablet Not Taking  No No   Sig: Take 1/2 tablet 1-2 hours before biggest craving time for 7 days, then increase to 1/2 tablet twice daily.   Patient not taking: Reported on 7/12/2024   topiramate (TOPAMAX) 25 MG tablet Not Taking  No No   Sig: Take 25 mg daily week 1, increase to 50 mg daily week 2, then increase to 75 mg daily week 3 and beyond   Patient not taking: Reported on 7/12/2024      Facility-Administered Medications: None      ------------------------------------------------------------------------     Physical Exam   Vital Signs: Temp: 98.1  F (36.7  C) Temp src: Oral BP: 131/73 Pulse: 81   Resp: 16 SpO2: 99 % O2 Device: None (Room air)    Weight: 0 lbs 0 oz    Constitutional:  NAD,   Neuropsyche: Very pleasant affect, gives a clear coherent history.  Alert and oriented, answers questions appropriately. Speech normal, face symmetric and moving all 4 extremities without gross focal neurological deficit.   Respiratory:  Breathing comfortably, good air exchange, no wheezes, no crackles.   Cardiovascular:  Regular rate and rhythm, no edema.  GI:  soft, NT/ND, BS normal  Skin/Integumen: No bruising or petechiae, no acute rash or sign of bleeding.       Medical Decision Making       Over 60 MINUTES SPENT BY ME on the date of service doing chart review, history, exam, documentation & further activities per the note.      Data     I have personally reviewed the following data over the past 24 hrs:    5.9  \   8.8 (L)   / 232     N/A N/A N/A /  N/A   4.0 N/A N/A \       Imaging results reviewed over the past 24 hrs:   No results found for this or any previous visit (from the past 24 hour(s)).

## 2024-07-14 VITALS
HEART RATE: 75 BPM | WEIGHT: 261.4 LBS | DIASTOLIC BLOOD PRESSURE: 84 MMHG | SYSTOLIC BLOOD PRESSURE: 133 MMHG | RESPIRATION RATE: 16 BRPM | BODY MASS INDEX: 39.75 KG/M2 | OXYGEN SATURATION: 99 % | TEMPERATURE: 98.6 F

## 2024-07-14 LAB
ANION GAP SERPL CALCULATED.3IONS-SCNC: 6 MMOL/L (ref 7–15)
BUN SERPL-MCNC: 6.6 MG/DL (ref 6–20)
CALCIUM SERPL-MCNC: 8.2 MG/DL (ref 8.6–10)
CHLORIDE SERPL-SCNC: 112 MMOL/L (ref 98–107)
CREAT SERPL-MCNC: 0.58 MG/DL (ref 0.67–1.17)
DEPRECATED HCO3 PLAS-SCNC: 25 MMOL/L (ref 22–29)
EGFRCR SERPLBLD CKD-EPI 2021: >90 ML/MIN/1.73M2
GLUCOSE SERPL-MCNC: 100 MG/DL (ref 70–99)
HBA1C MFR BLD: 5.1 %
HGB BLD-MCNC: 8.2 G/DL (ref 13.3–17.7)
MAGNESIUM SERPL-MCNC: 2.1 MG/DL (ref 1.7–2.3)
POTASSIUM SERPL-SCNC: 4.5 MMOL/L (ref 3.4–5.3)
SODIUM SERPL-SCNC: 143 MMOL/L (ref 135–145)
UPPER GI ENDOSCOPY: NORMAL

## 2024-07-14 PROCEDURE — 36415 COLL VENOUS BLD VENIPUNCTURE: CPT | Performed by: INTERNAL MEDICINE

## 2024-07-14 PROCEDURE — 250N000011 HC RX IP 250 OP 636: Performed by: INTERNAL MEDICINE

## 2024-07-14 PROCEDURE — 83036 HEMOGLOBIN GLYCOSYLATED A1C: CPT | Performed by: INTERNAL MEDICINE

## 2024-07-14 PROCEDURE — 250N000013 HC RX MED GY IP 250 OP 250 PS 637: Performed by: INTERNAL MEDICINE

## 2024-07-14 PROCEDURE — 80048 BASIC METABOLIC PNL TOTAL CA: CPT | Performed by: INTERNAL MEDICINE

## 2024-07-14 PROCEDURE — 85018 HEMOGLOBIN: CPT | Performed by: INTERNAL MEDICINE

## 2024-07-14 PROCEDURE — 99231 SBSQ HOSP IP/OBS SF/LOW 25: CPT | Performed by: SURGERY

## 2024-07-14 PROCEDURE — 0W3P8ZZ CONTROL BLEEDING IN GASTROINTESTINAL TRACT, VIA NATURAL OR ARTIFICIAL OPENING ENDOSCOPIC: ICD-10-PCS | Performed by: INTERNAL MEDICINE

## 2024-07-14 PROCEDURE — 99239 HOSP IP/OBS DSCHRG MGMT >30: CPT | Performed by: INTERNAL MEDICINE

## 2024-07-14 PROCEDURE — 83735 ASSAY OF MAGNESIUM: CPT | Performed by: INTERNAL MEDICINE

## 2024-07-14 RX ORDER — SUCRALFATE 1 G/1
1 TABLET ORAL 4 TIMES DAILY
Qty: 56 TABLET | Refills: 1 | Status: SHIPPED | OUTPATIENT
Start: 2024-07-14

## 2024-07-14 RX ORDER — POLYETHYLENE GLYCOL 3350 17 G/17G
17 POWDER, FOR SOLUTION ORAL DAILY
Qty: 116 G | Refills: 0 | Status: SHIPPED | OUTPATIENT
Start: 2024-07-14

## 2024-07-14 RX ORDER — PANTOPRAZOLE SODIUM 40 MG/1
40 TABLET, DELAYED RELEASE ORAL DAILY
Qty: 30 TABLET | Refills: 1 | Status: SHIPPED | OUTPATIENT
Start: 2024-07-14

## 2024-07-14 RX ADMIN — PANTOPRAZOLE SODIUM 40 MG: 40 INJECTION, POWDER, FOR SOLUTION INTRAVENOUS at 10:11

## 2024-07-14 RX ADMIN — CALCIUM CARBONATE (ANTACID) CHEW TAB 500 MG 1000 MG: 500 CHEW TAB at 05:05

## 2024-07-14 RX ADMIN — POTASSIUM CHLORIDE AND SODIUM CHLORIDE: 900; 150 INJECTION, SOLUTION INTRAVENOUS at 03:48

## 2024-07-14 RX ADMIN — PAROXETINE HYDROCHLORIDE 30 MG: 30 TABLET, FILM COATED ORAL at 10:04

## 2024-07-14 ASSESSMENT — ACTIVITIES OF DAILY LIVING (ADL)
ADLS_ACUITY_SCORE: 22
ADLS_ACUITY_SCORE: 21
ADLS_ACUITY_SCORE: 22
ADLS_ACUITY_SCORE: 21
ADLS_ACUITY_SCORE: 22
ADLS_ACUITY_SCORE: 21
ADLS_ACUITY_SCORE: 22

## 2024-07-14 NOTE — PLAN OF CARE
"ONLY POST BELOW FOR END OF SHIFT NOTE   07/13/24-07/14/24 [id number]    Orientation: A&Ox4  Aggression Stop Light: Green  Activity: Independent  Diet/BS Checks: Regular  Tele:  NSR  IV Access/Drains: L PIV SL, R PIV SL  Pain Management: TUMS given for misc GI complaint described as \"burning\"  Abnormal VS/Results: Hemoglobin 7.7, no orders to transfuse at this time  Bowel/Bladder: Cont B/B  Skin/Wounds: Scabs to lower extremities  Consults: GI  "
Goal Outcome Evaluation:           Orientation: A&Ox4  Aggression Stop Light: Green  Activity: Ind  Diet/BS Checks: full liq, randy well  Tele: NSR  IV Access/Drains: R PIV Infusing  Pain Management: PRN Tylenol given for headache x1, improved after food  Abnormal VS/Results: VSS, HGB  8.0 this am, no tx, recheck pending.    Bowel/Bladder: Cont, but no bowel movements all day.  Needs stool specimen, cup in bathroom.   Skin/Wounds: Scattered scabs to L foot.   Consults: Gen Surg, GI  D/C Disposition: Likely discharge tmr.  Other Info: EGD today, ulcer found cauterized and clipped.           
Orientation: A&Ox4  Aggression Stop Light: Green  Activity: Ind  Diet/BS Checks: NPO  Tele:  Yes  IV Access/Drains: L PIV Infusing  Pain Management: PRN Tylenol given for headache x1  Abnormal VS/Results: VSS, HGB 7.8 w/ orders to transfuse below 8. 1 unit of PRBCs given.   Bowel/Bladder: Cont B/B  Skin/Wounds: Scattered scabs to bilateral legs  Consults: Gen Surg, GI  D/C Disposition: TBD  Other Info: Possible EGD today  
no

## 2024-07-14 NOTE — DISCHARGE SUMMARY
[facility]    Discharge Summary  Hospitalist    Date of Admission:  7/12/2024  Date of Discharge:  7/14/2024  Discharging Provider:  JERSON Garrido MD, FACP     Date of Service (when I saw the patient): 07/14/24    Discharge Diagnoses     Acute blood loss anemia  Upper GI bleed  Hematemesis  Melena  Prior laparoscopic gastric bypass 5/2022  Non-bleeding gastric ulcer with a visible vessel  GERD  Normocytic anemia  Alcohol use  Hypocalcemia  Hyperglycemia  Prior symptomatic paroxysmal atrial fibrillation  Essential hypertension  Generalized anxiety disorder  Mild intermittent asthma      History of Present Illness   Per 7/12 H & P:  Bethel Woods is a pleasant 49 year old gentleman s/p laparoscopic gastric bypass 5/26/2022, atrial fibrillation s/p ablation who was admitted 7/12/2024 after transfer from [facility] for further evaluation of hematemesis and maroon loose stools starting the morning of 7/12.  Bethel reported vomiting dark blood and passing dark blood in his stool, beginning at 6:30 AM.  After arriving home from work he was initially taken to the ED in [place], where he received Reglan and Bismuth subsalicylate, which alleviated his symptoms.  ED staff reviewed with Dr. Hernandez of General Surgery [facility] ( re. hx of bariatric surgery) and with Dr. Kovacs of GI.      Hospital Course   Bethel Woods was admitted on 7/12/2024.  The following problems were addressed during his hospitalization:     Acute blood loss anemia  Upper GI bleed  Hematemesis  Melena  S/p laparoscopic gastric bypass 5/2022  - 7/13 EGD: Donnie-en-Y gastrojejunostomy with gastrojejunal anastomosis characterized by ulceration  Non-bleeding gastric ulcer with a visible vessel  GERD  Normocytic anemia; Hgb decreased to 7.8 earlier today, now up to 8.0  - transfused 1 U PRBC day of admission  - initially was NPO and treated with IVF; was transitioned back to regular diet after EGD, without incidentI  - was  started on Protonix 40 IV twice daily  - Hgb stable at time of discharge    Alcohol use  - per H & P, drinks ~ 3 x per month; may have had excessive intake just prior to admission  - no evidence for withdrawal since admission     Hypocalcemia at outside hospital  Hyperglycemia; 7/14 A1c = 5.1%  - ionized calcium normal     Prior symptomatic paroxysmal atrial fibrillation, s/p successful ablation 2021; no recurrence  Essential hypertension  - held metoprolol in the context of GI bleed; restarting on discharge     Generalized anxiety disorder  - continued PTA paroxetine and alprazolam as needed     Mild intermittent asthma  - no evidence of respiratory distress  - continued PTA albuterol inhaler as needed           JERSON Garrido MD, [place] Hospitalist    Significant Results and Procedures   See below and in EHR, including 7/13: EGD    Pending Results   None    Code Status   Full Code       Primary Care Physician   Rubén Austin    Physical Exam     Vitals:    07/12/24 2209 07/14/24 0500   Weight: 119.9 kg (264 lb 5.3 oz) 118.6 kg (261 lb 6.4 oz)     98.6  F (37  C) 75   133/84 16 99 %  (Room air)     Constitutional: awake, no apparent distress; lying in bed  HEENT: sclerae clear; MM's moist  Respiratory: good a/e bilaterally, no wheezing or rhonchi  Cardiovascular: regular rate and rhythm, S1, S2 noted; no m/r/g  GI: abdomen obese;  + bowel sounds; soft, non-tender, non-distended  Skin/Integumen: no rashes, no cyanosis, no jaundice  Musculoskeletal: no edema  Neurologic: follows directions well; no focal deficits       Discharge Disposition   Discharged to home  Condition at discharge: Stable    Consultations This Hospital Stay   GASTROENTEROLOGY IP CONSULT  SURGERY GENERAL IP CONSULT  VASCULAR ACCESS ADULT IP CONSULT    Time Spent on this Encounter   IViet MD, personally saw the patient today and spent greater than 30 minutes discharging this patient.    Discharge Orders      Reason  for your hospital stay    You were admitted for evaluation of multiple GI concerns and have made good progress.     Follow-up and recommended labs and tests     1. Follow up with primary care provider, Rubén Austin, within 7 days to evaluate medication change, to evaluate treatment change, to evaluate after surgery, for hospital follow- up, and regarding new diagnosis.    2. The following labs/tests are recommended in 7 days: CBC w/ platelets.    3. Follow up with [facility] in clinic 1-2 weeks, and:  4. Needs repeat Upper GI endoscopy w/ Dr. Kovacs in 2 months.     Activity    Your activity upon discharge: activity as tolerated and no driving for today     Diet    Follow this diet upon discharge: Orders Placed This Encounter      Regular Diet Adult     Discharge Medications   Discharge Medication List as of 7/14/2024  4:25 PM        START taking these medications    Details   pantoprazole (PROTONIX) 40 MG EC tablet Take 1 tablet (40 mg) by mouth daily, Disp-30 tablet, R-1, E-Prescribe      polyethylene glycol (MIRALAX) 17 GM/Dose powder Take 17 g by mouth daily, Disp-116 g, R-0, E-Prescribe      sucralfate (CARAFATE) 1 GM tablet Take 1 tablet (1 g) by mouth 4 times daily, Disp-56 tablet, R-1, E-Prescribe           CONTINUE these medications which have NOT CHANGED    Details   acetaminophen (TYLENOL) 500 MG tablet Take 500-1,000 mg by mouth every 6 hours as needed for mild pain Maximum of 1000mg each dose, Historical      albuterol (PROAIR HFA/PROVENTIL HFA/VENTOLIN HFA) 108 (90 Base) MCG/ACT inhaler Inhale 2 puffs into the lungs every 4 hours as needed for shortness of breath / dyspnea or wheezing, Historical      ALPRAZolam (XANAX) 1 MG tablet Take 0.5-1 mg by mouth every 6 hours as needed for anxiety, Historical      metoprolol tartrate (LOPRESSOR) 50 MG tablet Take 50 mg by mouth 2 times daily, Historical      multivitamin w/minerals (THERA-VIT-M) tablet Take 1 tablet by mouth daily, Historical      PARoxetine  (PAXIL) 30 MG tablet Take 30 mg by mouth daily, Historical      naltrexone (DEPADE/REVIA) 50 MG tablet Take 1/2 tablet 1-2 hours before biggest craving time for 7 days, then increase to 1/2 tablet twice daily., Disp-90 tablet, R-1, E-Prescribe      topiramate (TOPAMAX) 25 MG tablet Take 25 mg daily week 1, increase to 50 mg daily week 2, then increase to 75 mg daily week 3 and beyond, Disp-270 tablet, R-0, E-Prescribe           STOP taking these medications       NONFORMULARY Comments:   Reason for Stopping:             Allergies   Allergies   Allergen Reactions    Asa [Aspirin]      Gastric Bypass    Nsaids      Gastric Bypass    Phentermine      Elevated Blood pressure and panic attacks    Sertraline      Felt like I was going to pass out.     Data   Most Recent 3 CBC's:  Recent Labs   Lab Test 07/14/24  0633 07/13/24  1747 07/13/24  0908 07/13/24 0218 07/12/24 2108 02/03/24  1232   WBC  --   --  4.7  --  5.9 6.4   HGB 8.2* 7.7* 8.0*   < > 8.8* 14.3   MCV  --   --  87  --  87 87   PLT  --   --  192  --  232 275    < > = values in this interval not displayed.      Most Recent 3 BMP's:  Recent Labs   Lab Test 07/14/24  0633 07/13/24  0901 07/13/24 0218 07/12/24 2108 02/03/24  1232     --  140  --  142   POTASSIUM 4.5 3.8 4.0   < > 4.1   CHLORIDE 112*  --  111*  --  106   CO2 25  --  26  --  23   BUN 6.6  --  16.2  --  18.5   CR 0.58*  --  0.60*  --  0.64*   ANIONGAP 6*  --  3*  --  13   SALYL 8.2*  --  7.4*  --  9.4   *  --  90  --  116*    < > = values in this interval not displayed.     Most Recent 2 LFT's:  Recent Labs   Lab Test 02/03/24  1232 12/27/21  1217   AST 27 19   ALT 28 31   ALKPHOS 87 99   BILITOTAL 0.4 0.3     Most Recent TSH, T4 and A1c Labs:  Recent Labs   Lab Test 07/14/24  0633   A1C 5.1        Upper GI Endoscopy 07/13/2024 12:26 PM [place] [address] [address]   [id number]  _______________________________________________________________________________  Patient Name: Bethel Woods             Procedure Date: 7/13/2024 12:26 PM  MRN: [id number]                       Account Number: [id number]  YOB: 1975               Admit Type: Inpatient  Age: 49                               Room: [place] [address]  Note Status: Finalized                Attending MD: CASEY TORREZ MD,  Instrument Name: 507 GIF- Gastroscope  _______________________________________________________________________________    Procedure:                Upper GI endoscopy  Indications:              Iron deficiency anemia, Hematemesis, Melena  Providers:                CASEY TORREZ MD  Referring MD:              Medicines:                Fentanyl 100 micrograms IV, Midazolam 4 mg IV,                            Cetacaine spray  Complications:            No immediate complications.  _______________________________________________________________________________  Procedure:                Pre-Anesthesia Assessment:                                                                                      Findings:       The examined esophagus was normal.       Evidence of a Donnie-en-Y gastrojejunostomy was found. The gastrojejunal       anastomosis was characterized by ulceration. The duodenum-to-jejunum       limb was examined.       One non-bleeding cratered gastric ulcer with a visible vessel was found       at the anastomosis. The lesion was 10 mm in largest dimension. Area was       successfully injected with 1 mL of a 0.1 mg/mL solution of epinephrine       for drug delivery. Coagulation for hemostasis using bipolar probe was       successful. For hemostasis, three hemostatic clips were successfully       placed. Clip : mktg. There was no bleeding at       the end of the procedure.       The examined jejunum was normal.                                                                                    Impression:                 - Normal esophagus.  - Donnie-en-Y gastrojejunostomy with gastrojejunal anastomosis characterized by ulceration.  - Non-bleeding gastric ulcer with a visible vessel Injected.  - Treated with bipolar cautery  - Clips were placed. Clip : [employer].  - Normal examined jejunum.  - No specimens collected.    Recommendation:             - Please continue to follow with Primary care Physician.  - Return patient to hospital [facility] for ongoing care.  - Mechanical soft diet.  - Check hemoglobin q 6 hours for one day.  - Continue present medications.  - Repeat upper endoscopy in 2 months to check healing.                                                                                   Procedure Code(s):       --- Professional ---       43021, Esophagogastroduodenoscopy, flexible, transoral; with control of       bleeding, any method       96096, 59, Esophagogastroduodenoscopy, flexible, transoral; with       directed submucosal injection(s), any substance  Diagnosis Code(s):       --- Professional ---       Z98.0, Intestinal bypass and anastomosis status       K25.4, Chronic or unspecified gastric ulcer with hemorrhage       D50.9, Iron deficiency anemia, unspecified       K92.0, Hematemesis       K92.1, Melena (includes Hematochezia)    CPT copyright 2022 American Medical Association. All rights reserved.    The codes documented in this report are preliminary and upon  review may  be revised to meet current compliance requirements.    Electronically Signed by Micky Frey  ________________________  MICKY TORREZ MD  7/14/2024 2:28:55 PM

## 2024-07-14 NOTE — CONSULTS
[facility]  Gastroenterology Consultation         Bethel Woods  [address] [facility] [address]  49 year old male    Admission Date/Time: 7/12/2024  Primary Care Provider: Rubén Austin  Referring / Attending Physician: Soraya Hernandez MD    We were asked to see the patient in consultation by Dr. Hernandez for evaluation of acute GI bleed.      CC: Acute upper GI bleed    HPI:  Bethel Woods is a 49 year old male who was transferred here from [facility] where patient presented with episode of melena patient is status post gastric bypass about 2 years ago history of atrial fibrillation status post ablation who has been doing very well who presented with acute onset of hematemesis and maroon and melanotic stools patient remained hemodynamically stable patient was transferred to [facility] patient's hemoglobin on arrival is 9.8 patient is clinically doing very well no other new significant systemic complaints.  Patient is awake alert comfortable.  Review of system is completely unremarkable.    ROS: A comprehensive ten point review of systems was negative aside from those in mentioned in the HPI.      PAST MED HX:  I have reviewed this patient's medical history and updated it with pertinent information if needed.   Past Medical History:   Diagnosis Date    Asthma     Hypertension     Irregular heart beat 06/11/2021    Atrial fibrillation S/P Ablation    Obese     Panic attack     Prediabetes        MEDICATIONS:   Prior to Admission Medications   Prescriptions Last Dose Informant Patient Reported? Taking?   ALPRAZolam (XANAX) 1 MG tablet Unknown  Yes Yes   Sig: Take 0.5-1 mg by mouth every 6 hours as needed for anxiety   NONFORMULARY Past Week  Yes Yes   Sig: Take by mouth daily Vitamin D solution. 1 dropperful. Unsure of strength or dose   PARoxetine (PAXIL) 30 MG tablet Past Week  Yes Yes   Sig: Take 30 mg by mouth daily   acetaminophen (TYLENOL) 500 MG tablet 7/11/2024 at  hs 1500mg  Yes Yes   Sig: Take 500-1,000 mg by mouth every 6 hours as needed for mild pain Maximum of 1000mg each dose   albuterol (PROAIR HFA/PROVENTIL HFA/VENTOLIN HFA) 108 (90 Base) MCG/ACT inhaler Unknown  Yes Yes   Sig: Inhale 2 puffs into the lungs every 4 hours as needed for shortness of breath / dyspnea or wheezing   metoprolol tartrate (LOPRESSOR) 50 MG tablet Past Week  Yes Yes   Sig: Take 50 mg by mouth 2 times daily   multivitamin w/minerals (THERA-VIT-M) tablet Past Week  Yes Yes   Sig: Take 1 tablet by mouth daily   naltrexone (DEPADE/REVIA) 50 MG tablet Not Taking  No No   Sig: Take 1/2 tablet 1-2 hours before biggest craving time for 7 days, then increase to 1/2 tablet twice daily.   Patient not taking: Reported on 7/12/2024   topiramate (TOPAMAX) 25 MG tablet Not Taking  No No   Sig: Take 25 mg daily week 1, increase to 50 mg daily week 2, then increase to 75 mg daily week 3 and beyond   Patient not taking: Reported on 7/12/2024      Facility-Administered Medications: None       ALLERGIES:   Allergies   Allergen Reactions    Asa [Aspirin]      Gastric Bypass    Nsaids      Gastric Bypass    Phentermine      Elevated Blood pressure and panic attacks    Sertraline      Felt like I was going to pass out.       SOCIAL HISTORY:  Social History     Tobacco Use    Smoking status: Never    Smokeless tobacco: Never   Vaping Use    Vaping status: Never Used   Substance Use Topics    Alcohol use: Not Currently    Drug use: Never       FAMILY HISTORY:  Family History   Problem Relation Age of Onset    Obesity Mother     Coronary Artery Disease Father 62        MI- Fatal    Cerebrovascular Disease Father     Obesity Maternal Grandmother     Coronary Artery Disease Maternal Grandfather     Hypertension Maternal Grandfather     Hyperlipidemia Maternal Grandfather     Obesity Brother        PHYSICAL EXAM:   General awake alert oriented comfortable  Vital Signs with Ranges  Temp: 98.6  F (37  C) Temp src: Oral BP:  133/84 Pulse: 75   Resp: 16 SpO2: 99 % O2 Device: None (Room air)    I/O last 3 completed shifts:  In: 300   Out: -     Cardiovascular: negative, PMI normal. No lifts, heaves, or thrills. RRR. No murmurs, clicks gallops or rub  Respiratory: negative, Percussion normal. Good diaphragmatic excursion. Lungs clear  Head: Normocephalic. No masses, lesions, tenderness or abnormalities  Neck: Neck supple. No adenopathy. Thyroid symmetric, normal size,, Carotids without bruits.  Abdomen: Abdomen soft, non-tender. BS normal. No masses, organomegaly  SKIN: no suspicious lesions or rashes          ADDITIONAL COMMENTS:   I reviewed the patient's new clinical lab test results.   Recent Labs   Lab Test 07/14/24  0633 07/13/24  1747 07/13/24  0908 07/13/24 0218 07/12/24 2108 02/03/24  1232   WBC  --   --  4.7  --  5.9 6.4   HGB 8.2* 7.7* 8.0*   < > 8.8* 14.3   MCV  --   --  87  --  87 87   PLT  --   --  192  --  232 275    < > = values in this interval not displayed.     Recent Labs   Lab Test 07/14/24  0633 07/13/24  0901 07/13/24 0218 07/12/24 2108 02/03/24  1232   POTASSIUM 4.5 3.8 4.0   < > 4.1   CHLORIDE 112*  --  111*  --  106   CO2 25  --  26  --  23   BUN 6.6  --  16.2  --  18.5   ANIONGAP 6*  --  3*  --  13    < > = values in this interval not displayed.     Recent Labs   Lab Test 02/03/24  1232 12/27/21  1217   ALBUMIN 4.0 3.6   BILITOTAL 0.4 0.3   ALT 28 31   AST 27 19       I reviewed the patient's new imaging results.        CONSULTATION ASSESSMENT AND PLAN:    Active Problems:    Upper GI bleed    Assessment:   Very pleasant 49-year-old gentleman with history of Donnie-en-Y anastomosis gastric bypass in May 2022 patient was doing very well now.  Patient presented with acute onset of hematemesis melena with drop in hemoglobin in the range of 9 from baseline of 14 patient is hemodynamically stable clinically doing much better now.  Patient findings are highly worrisome for acute upper GI bleed most likely from  anastomotic ulcer in the setting of previous gastric bypass surgery I will recommend to continue on IV Protonix serial hemoglobin type and cross and transfuse plan to proceed with upper GI endoscopy and possible endoscopic therapy risk-benefit plan discussed in detail.  Thank you very much for letting me participate in his care.              Micky Kovacs MD, FACP  [facility].  Office: [phone]  Cell : [phone]      [facility]  Ph: [phone] Fax: [phone]

## 2024-07-14 NOTE — PROGRESS NOTES
[facility]  Gastroenterology Progress Note     Bethel Woods MRN# [id number]   YOB: 1975 Age: 49 year old          Assessment and Plan:       Upper GI bleed  Patient is clinically doing very well patient's hemoglobin is stable no further GI complaints patient is tolerating p.o.  Upper GI endoscopy yesterday showed anastomotic ulcer with visible vessel which was treated with endoscopic treatment and Endo Clip placement.  Complete hemostasis was achieved patient has been doing very well since then.  I will recommend to continue on Protonix 40 mg daily and sucralfate 1 g 4 times a day.  Patient can be discharged from GI standpoint.  I will recommend follow-up visit in GI clinic in 1 to 2 weeks and repeat upper GI endoscopy in 2 months.  [facility] will call to schedule patient's follow-up visit.       Data Unavailable      Interval History:     doing well; no cp, sob, n/v/d, or abd pain.              Review of Systems:     C: NEGATIVE for fever, chills, change in weight  E/M: NEGATIVE for ear, mouth and throat problems  R: NEGATIVE for significant cough or SOB  CV: NEGATIVE for chest pain, palpitations or peripheral edema             Medications:   I have reviewed this patient's current medications  Current Facility-Administered Medications   Medication Dose Route Frequency Provider Last Rate Last Admin    [Held by provider] metoprolol tartrate (LOPRESSOR) tablet 50 mg  50 mg Oral BID Soraya Hernandez MD        pantoprazole (PROTONIX) IV push injection 40 mg  40 mg Intravenous BID Soraya Hernandez MD   40 mg at 07/14/24 1011    PARoxetine (PAXIL) tablet 30 mg  30 mg Oral Daily Soraya Hernandez MD   30 mg at 07/14/24 1004    sodium chloride (PF) 0.9% PF flush 3 mL  3 mL Intracatheter Q8H Soraya Hernandez MD   3 mL at 07/13/24 2110                  Physical Exam:   Vitals were reviewed  Vital Signs with Ranges  Temp:  [98  F (36.7  C)-98.6  F (37  C)] 98.6  F (37  C)  Pulse:   [73-98] 75  Resp:  [15-16] 16  BP: (124-148)/(75-94) 133/84  SpO2:  [98 %-99 %] 99 %  I/O last 3 completed shifts:  In: 300   Out: -   Cardiovascular: negative, PMI normal. No lifts, heaves, or thrills. RRR. No murmurs, clicks gallops or rub  Respiratory: negative, Percussion normal. Good diaphragmatic excursion. Lungs clear  Head: Normocephalic. No masses, lesions, tenderness or abnormalities  Neck: Neck supple. No adenopathy. Thyroid symmetric, normal size,, Carotids without bruits.  Abdomen: Abdomen soft, non-tender. BS normal. No masses, organomegaly  SKIN: no suspicious lesions or rashes           Data:   I reviewed the patient's new clinical lab test results.   Recent Labs   Lab Test 07/14/24  0633 07/13/24  1747 07/13/24  0908 07/13/24 0218 07/12/24 2108 02/03/24  1232   WBC  --   --  4.7  --  5.9 6.4   HGB 8.2* 7.7* 8.0*   < > 8.8* 14.3   MCV  --   --  87  --  87 87   PLT  --   --  192  --  232 275    < > = values in this interval not displayed.     Recent Labs   Lab Test 07/14/24  0633 07/13/24  0901 07/13/24 0218 07/12/24 2108 02/03/24  1232   POTASSIUM 4.5 3.8 4.0   < > 4.1   CHLORIDE 112*  --  111*  --  106   CO2 25  --  26  --  23   BUN 6.6  --  16.2  --  18.5   ANIONGAP 6*  --  3*  --  13    < > = values in this interval not displayed.     Recent Labs   Lab Test 02/03/24  1232 12/27/21  1217   ALBUMIN 4.0 3.6   BILITOTAL 0.4 0.3   ALT 28 31   AST 27 19       I reviewed the patient's new imaging results.    All laboratory data reviewed  All imaging studies reviewed by me.    Micky Kovacs MD,  7/14/2024  [facility]  Office : [phone]  Cell: [phone]      [facility]  Ph: [phone] Fax: [phone]

## 2024-07-14 NOTE — PROGRESS NOTES
[facility]  General Surgery Progress Note        Roberto Peacock MD, MD   07/14/2024        Interval History:      Patient feels better.  Minimal pain, no red blood in stool, no emesis.  Hemoglobin stable.         Assessment and Plan:      Pleasant 49-year-old with bleeding marginal ulcer.  Managed endoscopically by Dr. Patrick in his team yesterday.  Low risk for acute recurrence, hemoglobin stable.  Patient can advance his diet and if he does well with that, okay for discharge today from my standpoint.  Patient should follow-up with Dr. Patrick for surveillance endoscopy and stay on a proton pump inhibitor.                   Physical Exam:      Blood pressure 133/84, pulse 75, temperature 98.6  F (37  C), temperature source Oral, resp. rate 16, weight 118.6 kg (261 lb 6.4 oz), SpO2 99%.  Vitals:    07/12/24 2209 07/14/24 0500   Weight: 119.9 kg (264 lb 5.3 oz) 118.6 kg (261 lb 6.4 oz)     Vital Signs with Ranges  Temp:  [98  F (36.7  C)-98.6  F (37  C)] 98.6  F (37  C)  Pulse:  [73-98] 75  Resp:  [8-62] 16  BP: (124-148)/(71-94) 133/84  SpO2:  [95 %-100 %] 99 %  I/O's Last 24 hours  I/O last 3 completed shifts:  In: 300   Out: -     Constitutional: Pleasant gentleman, no distress   Lungs: Breathing comfortably   Cardiovascular: Normotensive   Abdomen: Soft, nontender   Skin: Warm, dry   Imaging:           Medications:        Current Facility-Administered Medications   Medication Dose Route Frequency Provider Last Rate Last Admin    [Held by provider] metoprolol tartrate (LOPRESSOR) tablet 50 mg  50 mg Oral BID Soraya Hernandez MD        pantoprazole (PROTONIX) IV push injection 40 mg  40 mg Intravenous BID Soraya Hernandez MD   40 mg at 07/14/24 1011    PARoxetine (PAXIL) tablet 30 mg  30 mg Oral Daily Soraya Hernandez MD   30 mg at 07/14/24 1004    sodium chloride (PF) 0.9% PF flush 3 mL  3 mL Intracatheter Q8H Soraya Hernandez MD   3 mL at 07/13/24 2110            Data:      All new lab and  imaging data was reviewed.   Recent Labs   Lab Test 07/14/24  0633 07/13/24  1747 07/13/24  0908 07/13/24  0218 07/12/24  2108 02/03/24  1232   WBC  --   --  4.7  --  5.9 6.4   HGB 8.2* 7.7* 8.0*   < > 8.8* 14.3   MCV  --   --  87  --  87 87   PLT  --   --  192  --  232 275    < > = values in this interval not displayed.

## 2024-07-25 ENCOUNTER — TELEPHONE (OUTPATIENT)
Dept: SURGERY | Facility: CLINIC | Age: 49
End: 2024-07-25

## 2024-07-25 ENCOUNTER — VIRTUAL VISIT (OUTPATIENT)
Dept: CARDIOLOGY | Facility: CLINIC | Age: 49
End: 2024-07-25
Attending: PHYSICIAN ASSISTANT
Payer: COMMERCIAL

## 2024-07-25 VITALS — BODY MASS INDEX: 38.01 KG/M2 | WEIGHT: 250 LBS

## 2024-07-25 DIAGNOSIS — K91.2 POSTSURGICAL MALABSORPTION: Primary | ICD-10-CM

## 2024-07-25 DIAGNOSIS — I10 ESSENTIAL HYPERTENSION: ICD-10-CM

## 2024-07-25 DIAGNOSIS — J45.20 MILD INTERMITTENT ASTHMA WITHOUT COMPLICATION: ICD-10-CM

## 2024-07-25 DIAGNOSIS — K92.2 UPPER GI BLEED: ICD-10-CM

## 2024-07-25 DIAGNOSIS — I48.0 PAROXYSMAL ATRIAL FIBRILLATION (H): ICD-10-CM

## 2024-07-25 DIAGNOSIS — F41.1 GENERALIZED ANXIETY DISORDER: ICD-10-CM

## 2024-07-25 DIAGNOSIS — K59.09 OTHER CONSTIPATION: ICD-10-CM

## 2024-07-25 DIAGNOSIS — E66.812 OBESITY, CLASS II, BMI 35-39.9: ICD-10-CM

## 2024-07-25 DIAGNOSIS — Z98.84 BARIATRIC SURGERY STATUS: ICD-10-CM

## 2024-07-25 RX ORDER — FAMOTIDINE 20 MG
1 TABLET ORAL DAILY
Qty: 90 CAPSULE | Refills: 3 | Status: SHIPPED | OUTPATIENT
Start: 2024-07-25

## 2024-07-25 ASSESSMENT — PAIN SCALES - GENERAL: PAINLEVEL: NO PAIN (0)

## 2024-07-25 NOTE — PATIENT INSTRUCTIONS
"Recommendations from MTM Pharmacist visit:                                                    MTM (medication therapy management) is a service provided by a clinical pharmacist designed to help you get the most of out of your medicines.  You may be sent a phone or email survey evaluating today's visit.  Please provide feedback you have for the service he received today if you are able.      We will work to see if Wegovy will be covered with [employer] insurance.  Do not start until having this cleared by your gastroenterologist.    I sent a prescription for vitamin D 1000 IU to your mail order pharmacy.  You should take this once daily.  If is not covered by insurance, find an over-the-counter vitamin D supplement to take 1000 IUs daily.      Follow-up with gastroenterologist regarding frequency of sucralfate.    Follow-up: Via [employer] with Maegan Spangler RP on status of Wegovy approval with insurance and gastroenterology.        It was great speaking with you today.  I value your experience and would be very thankful for your time in providing feedback in our clinic survey. In the next few days, you may receive an email or text message from [facility] re[employer] with a link to a survey related to your  clinical pharmacist.\"     To schedule another MTM appointment, please call the clinic directly ([facility] Phone Number: [phone] (schedules for [facility] and [facility] - providers, dietitians, health coaches) or you may call the MTM scheduling line at [phone] or toll-free at [phone].     My Clinical Pharmacist's contact information:                                                      Please feel free to contact me with any questions or concerns you have.      Maegan Spangler, Pharm D., MPH    Medication Therapy Management Pharmacist   [facility]           "

## 2024-07-25 NOTE — Clinical Note
Tonya Chong has had a rough few weeks!   Upper GI ulcer/bleed! It's healing, but is following with GI currently. He had GI upset with naltrexone prior to GI bleed, so stopped this on his own.  He has [facility] insurance which in my experience does not cover GLP1/GIP, but we are looking into it. Cannot afford compounded semaglutide.   He won't fill the Wegovy if it's covered until GI approves (do not want to worsen reflux, etc after ulcer).  We will be follow up via [employer] in the next few weeks to see if/when GI approves starting Wegovy (if insurance covers it!). Otherwise back to the drawing board for tonya Chong.  I'll keep you posted! Maegan

## 2024-07-25 NOTE — PROGRESS NOTES
Medication Therapy Management (MTM) Encounter    ASSESSMENT:                            Medication Adherence/Access: Will start prior authorization process for GLP-1 today (see below); if no insurance coverage, may consider alternatives to help manage appetite (see below).  Hepatic semaglutide not affordable for patient.    Weight Management /s/p RYGB 2022: Patient would benefit from additional pharmacotherapy for weight management. Given class III obesity, recommend GLP1/GIP therapy as data to support most significant weight loss and patient has no contraindications.  Given recent ulceration and risk of increased nausea, constipation, acid reflux with starting GLP-1, recommend clearance from GI before starting GLP-1 for weight management.  Patient affirms understanding.  Patient also likely to benefit from reduction in food noise and increased satiety. Discussed dietary and behavioral modifications.     If GLP-1 not covered or approved by GI at this time, may consider metformin or bupropion with close monitoring of A-fib/hypertension and anxiety.  May also consider higher dose topiramate (75 mg not effective) -recommend repeat BMP prior to reinitiation of topiramate given hyperchloremia noted in hospital.  Do not recommend ultrasound given side effects.  Not recommend phentermine given history of anxiety, hypertension and A-fib.    Patient has not started recommended vitamin D - sent prescription today to help with access.    Upper GI Bleed/Ulcer/GERD: Given patient improvement in symptoms, may consider pantoprazole monotherapy given lightheadedness with sucralfate.  Deferred GI follow-up today.    Asthma: Stable.    Afib/Hypertension: Stable.  May consider switching from Toprol tartrate to metoprolol succinate in the future to help with reducing pill burden    SHALOM: Stable.    Constipation: Stable.    PLAN:                            We will work to see if Wegovy will be covered with [employer] insurance.   "Do not start until having this cleared by your gastroenterologist.    I sent a prescription for vitamin D 1000 IU to your mail order pharmacy.  You should take this once daily.  If is not covered by insurance, find an over-the-counter vitamin D supplement to take 1000 IUs daily.      Follow-up with gastroenterologist regarding frequency of sucralfate.    Follow-up: Via [employer] with Maegan Spangler [facility] on status of Wegovy approval with insurance and gastroenterology.    SUBJECTIVE/OBJECTIVE:                          Bethel Woods is a 49 year old male seen for an initial visit. He was referred to me from Elaina Wilkins PA-C .      Reason for visit: Medication Therapy Management - weight Management .    Allergies/ADRs: Reviewed in chart  Past Medical History: Reviewed in chart  Tobacco: He reports that he has never smoked. He has never used smokeless tobacco.  Alcohol: occasionally     Medication Adherence/Access: Medication barriers: obtaining medication from insurance.  Unclear whether [employer] covers GLP-1 therapy.    Discussed compounded semaglutide -not affordable to patient living [facility] [place]1 therapy needs to be less than $100 per month.    Weight Management /s/p RYGB 2022  Supplements  Multivitamin once daily  Vitamin D not reported as taking    Last seen by Elaina Wilkins 5/24/24 for return bariatric surgery visit    Tried topiramate (not effective at 75mg dose) and  naltrexone caused GI upset prior to recent GI bleed. patient reports significant food noise.  Frustrated that others seeing weight loss with GLP-1 therapy and not sure able to afford without insurance coverage.    Nutrition/Eating Habits: Patient is working to limit portion sizes, difficult when working overnights and thinks about food often as distraction    Exercise/Activity: Goal to continue to increase activity.     Medications Tried/Failed:  Topiramate: Not effective  Naltrexone: GI distress \" knots in " "stomach\"  Phentermine: Caused elevated blood pressure and panic attacks historically  GLP1/GIP: Patient denies personal or family history of MEN Type2, MTC, Pancreatitis.     Initial Consult Weight: 376     Current weight today: 250 lbs 0 oz  Cumulative Weight Loss: -126 lb, -33.55% from baseline    Wt Readings from Last 4 Encounters:   07/25/24 113.4 kg (250 lb)   07/14/24 118.6 kg (261 lb 6.4 oz)   05/24/24 113.4 kg (250 lb)   10/27/23 118.7 kg (261 lb 9.6 oz)     Estimated body mass index is 38.01 kg/m  as calculated from the following:    Height as of 5/24/24: 1.727 m (5' 8\").    Weight as of this encounter: 113.4 kg (250 lb).    Lab Results   Component Value Date    A1C 5.1 07/14/2024     (H) 07/14/2024     07/14/2024    POTASSIUM 4.5 07/14/2024    SALLY 8.2 (L) 07/14/2024    CHLORIDE 112 (H) 07/14/2024    CO2 25 07/14/2024    BUN 6.6 07/14/2024    CR 0.58 (L) 07/14/2024    GFRESTIMATED >90 07/14/2024    VITDT 31 07/12/2024    B12 977 02/03/2024    TSH 1.89 06/03/2009       Upper GI Bleed/Ulcer/GERD:    Sucralfate 1G tablet -2-4 times daily (reduced frequency due to dizziness)  Pantoprazole 40 mg daily    Patient recently in the ER for upper GI bleed.  GI Ulcer with bleed.  Unknown etiology.  Feeling better now.  Has follow-up with GI today to discuss next labs.  If improving, we will repeat endoscopy in 2 months.      Asthma:   Albuterol MDI 2 puffs as needed for shortness of breath and wheezing    No issues reported.       Afib/Hypertension:  Metoprolol tartrate 50 mg twice daily   no current anticoagulation for stroke prevention    S/p successful ablation 2021   Patient reports no current medication side effects.    Patient does have a history of GI bleed.     BP Readings from Last 3 Encounters:   07/14/24 133/84   11/28/22 127/76   08/29/22 124/78         SHALOM:  Paroxetine 30 mg once daily  Alprazolam 1 mg - 1/2 - 1 tab as needed for anxiety.    Had loss of dog in Dec; now has new dog, Santa who " helps with anxiety and mental health.  Rarely needs alprazolam -refilled about once per year.  Current regimen works well    Constipation:  MiraLAX 17 g daily as needed    Works well. Denies side effects.     Today's Vitals: Wt 113.4 kg (250 lb)   BMI 38.01 kg/m    ----------------  Post Discharge Medication Reconciliation Status: discharge medications reconciled and changed, per note/orders.    I spent 20 minutes with this patient today. All changes were made via collaborative practice agreement with Elaina Wilkins. A copy of the visit note was provided to the patient's provider(s).    A summary of these recommendations was sent via [employer].    Maegan Spangler, Pharm D., MPH    Medication Therapy Management Pharmacist   [facility]      Telemedicine Visit Details  Type of service:  Video Conference via [employer]  Start Time:  12:31 PM  End Time:  12:51 PM     Medication Therapy Recommendations  Obesity, Class II, BMI 35-39.9    Current Medication: naltrexone (DEPADE/REVIA) 50 MG tablet (Discontinued)   Rationale: More effective medication available - Ineffective medication - Effectiveness   Recommendation: Change Medication   Status: Accepted per CPA         Postsurgical malabsorption    Current Medication: Vitamin D, Cholecalciferol, 25 MCG (1000 UT) CAPS   Rationale: Patient forgets to take - Adherence - Adherence   Recommendation: Provide Adherence Intervention   Status: Accepted per CPA

## 2024-07-25 NOTE — LETTER
7/25/2024      RE: Bethel Woods  [address]  [place] [address]       Dear Colleague,    Thank you for the opportunity to participate in the care of your patient, Bethel Woods, at the [facility] [place] at [facility]. Please see a copy of my visit note below.    Medication Therapy Management (MTM) Encounter    ASSESSMENT:                            Medication Adherence/Access: Will start prior authorization process for GLP-1 today (see below); if no insurance coverage, may consider alternatives to help manage appetite (see below).  Hepatic semaglutide not affordable for patient.    Weight Management /s/p RYGB 2022: Patient would benefit from additional pharmacotherapy for weight management. Given class III obesity, recommend GLP1/GIP therapy as data to support most significant weight loss and patient has no contraindications.  Given recent ulceration and risk of increased nausea, constipation, acid reflux with starting GLP-1, recommend clearance from GI before starting GLP-1 for weight management.  Patient affirms understanding.  Patient also likely to benefit from reduction in food noise and increased satiety. Discussed dietary and behavioral modifications.     If GLP-1 not covered or approved by GI at this time, may consider metformin or bupropion with close monitoring of A-fib/hypertension and anxiety.  May also consider higher dose topiramate (75 mg not effective) -recommend repeat BMP prior to reinitiation of topiramate given hyperchloremia noted in hospital.  Do not recommend ultrasound given side effects.  Not recommend phentermine given history of anxiety, hypertension and A-fib.    Patient has not started recommended vitamin D - sent prescription today to help with access.    Upper GI Bleed/Ulcer/GERD: Given patient improvement in symptoms, may consider pantoprazole monotherapy given lightheadedness with sucralfate.  Deferred GI  follow-up today.    Asthma: Stable.    Afib/Hypertension: Stable.  May consider switching from Toprol tartrate to metoprolol succinate in the future to help with reducing pill burden    SHALOM: Stable.    Constipation: Stable.    PLAN:                            We will work to see if Wegovy will be covered with [employer] insurance.  Do not start until having this cleared by your gastroenterologist.    I sent a prescription for vitamin D 1000 IU to your mail order pharmacy.  You should take this once daily.  If is not covered by insurance, find an over-the-counter vitamin D supplement to take 1000 IUs daily.      Follow-up with gastroenterologist regarding frequency of sucralfate.    Follow-up: Via [employer] with Maegan Spangler [facility] on status of Wegovy approval with insurance and gastroenterology.    SUBJECTIVE/OBJECTIVE:                          Bethel Woods is a 49 year old male seen for an initial visit. He was referred to me from Elaina Wilkins PA-C .      Reason for visit: Medication Therapy Management - weight Management .    Allergies/ADRs: Reviewed in chart  Past Medical History: Reviewed in chart  Tobacco: He reports that he has never smoked. He has never used smokeless tobacco.  Alcohol: occasionally     Medication Adherence/Access: Medication barriers: obtaining medication from insurance.  Unclear whether [employer] covers GLP-1 therapy.    Discussed compounded semaglutide -not affordable to patient living [facility] [place]P-1 therapy needs to be less than $100 per month.    Weight Management/s/p RYGB 2022  Supplements  Multivitamin once daily  Vitamin D not reported as taking    Last seen by Elaina Wilkins 5/24/24 for return bariatric surgery visit    Tried topiramate (not effective at 75mg dose) and  naltrexone caused GI upset prior to recent GI bleed. patient reports significant food noise.  Frustrated that others seeing weight loss with GLP-1 therapy and not sure able  "to afford without insurance coverage.    Nutrition/Eating Habits: Patient is working to limit portion sizes, difficult when working overnights and thinks about food often as distraction    Exercise/Activity: Goal to continue to increase activity.     Medications Tried/Failed:  Topiramate: Not effective  Naltrexone: GI distress \" knots in stomach\"  Phentermine: Caused elevated blood pressure and panic attacks historically  GLP1/GIP: Patient denies personal or family history of MEN Type2, MTC, Pancreatitis.     Initial Consult Weight: 376     Current weight today: 250 lbs 0 oz  Cumulative Weight Loss: -126 lb, -33.55% from baseline    Wt Readings from Last 4 Encounters:   07/25/24 113.4 kg (250 lb)   07/14/24 118.6 kg (261 lb 6.4 oz)   05/24/24 113.4 kg (250 lb)   10/27/23 118.7 kg (261 lb 9.6 oz)     Estimated body mass index is 38.01 kg/m  as calculated from the following:    Height as of 5/24/24: 1.727 m (5' 8\").    Weight as of this encounter: 113.4 kg (250 lb).    Lab Results   Component Value Date    A1C 5.1 07/14/2024     (H) 07/14/2024     07/14/2024    POTASSIUM 4.5 07/14/2024    SALLY 8.2 (L) 07/14/2024    CHLORIDE 112 (H) 07/14/2024    CO2 25 07/14/2024    BUN 6.6 07/14/2024    CR 0.58 (L) 07/14/2024    GFRESTIMATED >90 07/14/2024    VITDT 31 07/12/2024    B12 977 02/03/2024    TSH 1.89 06/03/2009       Upper GI Bleed/Ulcer/GERD:    Sucralfate 1G tablet -2-4 times daily (reduced frequency due to dizziness)  Pantoprazole 40 mg daily    Patient recently in the ER for upper GI bleed.  GI Ulcer with bleed.  Unknown etiology.  Feeling better now.  Has follow-up with GI today to discuss next labs.  If improving, we will repeat endoscopy in 2 months.      Asthma:   Albuterol MDI 2 puffs as needed for shortness of breath and wheezing    No issues reported.       Afib/Hypertension:  Metoprolol tartrate 50 mg twice daily   no current anticoagulation for stroke prevention    S/p successful ablation 2021 "   Patient reports no current medication side effects.    Patient does have a history of GI bleed.     BP Readings from Last 3 Encounters:   07/14/24 133/84   11/28/22 127/76   08/29/22 124/78         SHALOM:  Paroxetine 30 mg once daily  Alprazolam 1 mg - 1/2 - 1 tab as needed for anxiety.    Had loss of dog in Dec; now has new dog, Santa who helps with anxiety and mental health.  Rarely needs alprazolam -refilled about once per year.  Current regimen works well    Constipation:  MiraLAX 17 g daily as needed    Works well. Denies side effects.     Today's Vitals: Wt 113.4 kg (250 lb)   BMI 38.01 kg/m    ----------------  Post Discharge Medication Reconciliation Status: discharge medications reconciled and changed, per note/orders.    I spent 20 minutes with this patient today. All changes were made via collaborative practice agreement with Elaina Wilkins. A copy of the visit note was provided to the patient's provider(s).    A summary of these recommendations was sent via [employer].    Maegan Spangler, Pharm D., MPH    Medication Therapy Management Pharmacist   [facility]      Telemedicine Visit Details  Type of service:  Video Conference via [employer]  Start Time:  12:31 PM  End Time:  12:51 PM     Medication Therapy Recommendations  Obesity, Class II, BMI 35-39.9    Current Medication: naltrexone (DEPADE/REVIA) 50 MG tablet (Discontinued)   Rationale: More effective medication available - Ineffective medication - Effectiveness   Recommendation: Change Medication   Status: Accepted per CPA         Postsurgical malabsorption    Current Medication: Vitamin D, Cholecalciferol, 25 MCG (1000 UT) CAPS   Rationale: Patient forgets to take - Adherence - Adherence   Recommendation: Provide Adherence Intervention   Status: Accepted per CPA                Please do not hesitate to contact me if you have any questions/concerns.     Sincerely,     Maegan Spangler, [facility]

## 2024-07-25 NOTE — TELEPHONE ENCOUNTER
PA Initiation    Medication: WEGOVY 0.25 MG/0.5ML SC SOAJ  Insurance Company: [employer] - Phone [phone] Fax [phone]  Pharmacy Filling the Rx: [facility] - [place] - [address] [facility] AT PORTAL TO REGISTERED [facility] SITES  Filling Pharmacy Phone: [phone]  Filling Pharmacy Fax: [phone]  Start Date: 7/25/2024

## 2024-07-26 NOTE — TELEPHONE ENCOUNTER
PRIOR AUTHORIZATION DENIED    Medication: WEGOVY 0.25 MG/0.5ML SC SOAJ  Insurance Company: [employer] - Phone [phone] Fax [phone]  Denial Date: 7/26/2024  Denial Reason(s):   Appeal Information:   Patient Notified: clinic to discuss with pt what they would like to do

## 2024-09-09 ENCOUNTER — TELEPHONE (OUTPATIENT)
Dept: PHARMACY | Facility: CLINIC | Age: 49
End: 2024-09-09
Payer: COMMERCIAL

## 2024-09-09 NOTE — TELEPHONE ENCOUNTER
No vm/ sent myc msg 9/9    Can we please call Bethel to schedule a visit with Elaina Wilkins and me (3 months between approximately)?     Thank you!   Maegan

## 2024-10-29 ENCOUNTER — DOCUMENTATION ONLY (OUTPATIENT)
Dept: SURGERY | Facility: CLINIC | Age: 49
End: 2024-10-29
Payer: COMMERCIAL

## 2025-07-13 ENCOUNTER — HEALTH MAINTENANCE LETTER (OUTPATIENT)
Age: 50
End: 2025-07-13

## (undated) DEVICE — SYR 03ML LL W/O NDL 309657

## (undated) DEVICE — ENDO TROCAR FIRST ENTRY KII FIOS Z-THRD 12X100MM CTF73

## (undated) DEVICE — BNDG ABDOMINAL BINDER 9X62-84" 79-89210

## (undated) DEVICE — SUCTION CATH 18FR ORAL TRI-FLO T62

## (undated) DEVICE — EVAC SYSTEM CLEAR FLOW SC082500

## (undated) DEVICE — NDL SPINAL 18GA 3.5" 405184

## (undated) DEVICE — SUCTION CANISTER MEDIVAC LINER 3000ML W/LID 65651-530

## (undated) DEVICE — ENDO TROCAR SLEEVE KII Z-THREADED 12X100MM CTS22

## (undated) DEVICE — ENDO SCOPE WARMER LF TM500

## (undated) DEVICE — SU VICRYL 0 TIE 12X18" J906G

## (undated) DEVICE — DRAPE LEGGINGS 8421

## (undated) DEVICE — CLIP APPLIER ENDO ROTATING 10MM MED/LG ER320

## (undated) DEVICE — ESU DISSECTOR SONICISION CORDLESS 39CM SCD396

## (undated) DEVICE — NDL 19GA 1.5"

## (undated) DEVICE — SOL NACL 0.9% IRRIG 1000ML BOTTLE 2F7124

## (undated) DEVICE — TUBING C02 INSUFFLATION LAP FILTER HEATER 6198

## (undated) DEVICE — SOL NACL 0.9% IRRIG 3000ML BAG 2B7477

## (undated) DEVICE — DECANTER VIAL 2006S

## (undated) DEVICE — PACK LAP CHOLE SLC15LCFSD

## (undated) DEVICE — ENDO POUCH UNIV RETRIEVAL SYSTEM INZII 10MM CD001

## (undated) DEVICE — ESU GROUND PAD UNIVERSAL W/O CORD

## (undated) DEVICE — STPL RELOAD REG TISSUE ECHELON 45 X 3.6MM BLUE GST45B

## (undated) DEVICE — STPL POWERED ECHELON 45MM PSEE45A

## (undated) DEVICE — GLOVE PROTEXIS BLUE W/NEU-THERA 7.5  2D73EB75

## (undated) DEVICE — SYR 50ML CATH TIP W/O NDL 309620

## (undated) DEVICE — STPL CIRCULAR XL 25MM W/TILT TIP EEAXL25

## (undated) DEVICE — SU VICRYL 3-0 SH 27" J316H

## (undated) DEVICE — PREP CHLORAPREP 26ML TINTED ORANGE  260815

## (undated) DEVICE — SU SILK 2-0 SH 30" K833H

## (undated) DEVICE — DRAPE BREAST/CHEST 29420

## (undated) DEVICE — COVER ULTRASOUND PROBE FLEXI-FEEL 6X48" LF 25-FF648

## (undated) DEVICE — LINEN TOWEL PACK X5 5464

## (undated) DEVICE — SOL WATER IRRIG 1000ML BOTTLE 2F7114

## (undated) DEVICE — INTRODUCER EEA STPL TRANS ANAL/ABD 25MM EEATAID25

## (undated) DEVICE — STPL DELIVERY DEVICE TRANS-ORAL ANVIL 25MM EEAORVIL25A

## (undated) DEVICE — Device

## (undated) DEVICE — GLOVE PROTEXIS MICRO 7.5  2D73PM75

## (undated) DEVICE — ESU HOLDER LAP INST DISP PURPLE LONG 330MM H-PRO-330

## (undated) DEVICE — SU MONOCRYL 4-0 PS-2 18" UND Y496G

## (undated) DEVICE — SOL DEXTROSE 5% 250ML BAG 2B0062Q

## (undated) DEVICE — SUCTION IRR STRYKERFLOW II W/TIP 250-070-520

## (undated) RX ORDER — FENTANYL CITRATE 50 UG/ML
INJECTION, SOLUTION INTRAMUSCULAR; INTRAVENOUS
Status: DISPENSED
Start: 2024-07-13

## (undated) RX ORDER — ACETAMINOPHEN 325 MG/1
TABLET ORAL
Status: DISPENSED
Start: 2022-05-26

## (undated) RX ORDER — LIDOCAINE HYDROCHLORIDE 20 MG/ML
INJECTION, SOLUTION EPIDURAL; INFILTRATION; INTRACAUDAL; PERINEURAL
Status: DISPENSED
Start: 2022-05-26

## (undated) RX ORDER — PROPOFOL 10 MG/ML
INJECTION, EMULSION INTRAVENOUS
Status: DISPENSED
Start: 2022-05-26

## (undated) RX ORDER — DEXMEDETOMIDINE HYDROCHLORIDE 4 UG/ML
INJECTION, SOLUTION INTRAVENOUS
Status: DISPENSED
Start: 2022-05-26

## (undated) RX ORDER — BUPIVACAINE HYDROCHLORIDE 2.5 MG/ML
INJECTION, SOLUTION EPIDURAL; INFILTRATION; INTRACAUDAL
Status: DISPENSED
Start: 2022-05-26

## (undated) RX ORDER — ONDANSETRON 2 MG/ML
INJECTION INTRAMUSCULAR; INTRAVENOUS
Status: DISPENSED
Start: 2022-05-26

## (undated) RX ORDER — DEXAMETHASONE SODIUM PHOSPHATE 4 MG/ML
INJECTION, SOLUTION INTRA-ARTICULAR; INTRALESIONAL; INTRAMUSCULAR; INTRAVENOUS; SOFT TISSUE
Status: DISPENSED
Start: 2022-05-26

## (undated) RX ORDER — HEPARIN SODIUM 5000 [USP'U]/.5ML
INJECTION, SOLUTION INTRAVENOUS; SUBCUTANEOUS
Status: DISPENSED
Start: 2022-05-26

## (undated) RX ORDER — FENTANYL CITRATE 0.05 MG/ML
INJECTION, SOLUTION INTRAMUSCULAR; INTRAVENOUS
Status: DISPENSED
Start: 2022-05-26

## (undated) RX ORDER — EPINEPHRINE 1 MG/ML
INJECTION, SOLUTION INTRAMUSCULAR; SUBCUTANEOUS
Status: DISPENSED
Start: 2022-05-26

## (undated) RX ORDER — FENTANYL CITRATE 50 UG/ML
INJECTION, SOLUTION INTRAMUSCULAR; INTRAVENOUS
Status: DISPENSED
Start: 2022-05-26

## (undated) RX ORDER — GLYCOPYRROLATE 0.2 MG/ML
INJECTION, SOLUTION INTRAMUSCULAR; INTRAVENOUS
Status: DISPENSED
Start: 2022-05-26

## (undated) RX ORDER — CEFAZOLIN SODIUM/WATER 3 G/30 ML
SYRINGE (ML) INTRAVENOUS
Status: DISPENSED
Start: 2022-05-26